# Patient Record
Sex: FEMALE | Race: WHITE | NOT HISPANIC OR LATINO | Employment: OTHER | URBAN - METROPOLITAN AREA
[De-identification: names, ages, dates, MRNs, and addresses within clinical notes are randomized per-mention and may not be internally consistent; named-entity substitution may affect disease eponyms.]

---

## 2017-01-06 ENCOUNTER — OFFICE VISIT (OUTPATIENT)
Dept: SURGERY | Facility: CLINIC | Age: 37
End: 2017-01-06
Payer: MEDICARE

## 2017-01-06 VITALS
HEIGHT: 66 IN | SYSTOLIC BLOOD PRESSURE: 118 MMHG | WEIGHT: 151.25 LBS | TEMPERATURE: 97 F | HEART RATE: 99 BPM | DIASTOLIC BLOOD PRESSURE: 75 MMHG | BODY MASS INDEX: 24.31 KG/M2

## 2017-01-06 DIAGNOSIS — K31.1 GASTRIC OUTFLOW OBSTRUCTION: Primary | ICD-10-CM

## 2017-01-06 PROCEDURE — 99213 OFFICE O/P EST LOW 20 MIN: CPT | Mod: PBBFAC,PO | Performed by: SURGERY

## 2017-01-06 PROCEDURE — 99024 POSTOP FOLLOW-UP VISIT: CPT | Mod: ,,, | Performed by: SURGERY

## 2017-01-06 PROCEDURE — 99999 PR PBB SHADOW E&M-EST. PATIENT-LVL III: CPT | Mod: PBBFAC,,, | Performed by: SURGERY

## 2017-01-06 NOTE — PROGRESS NOTES
Patient taken from OSH for an obstructing lap band  Was removed  Patient has since been to the ED for a thorough evaluation  No problems detected  She arrives today 45 minutes late  Well healed  No problems with nausea or vomiting  She complains of pain and asks for pain meds  I have corrected the problem that brought her to [facility] and will not see her in the future unless another acute issue develops  Pain meds NOT given

## 2017-06-01 ENCOUNTER — HOSPITAL ENCOUNTER (EMERGENCY)
Facility: OTHER | Age: 37
Discharge: HOME | End: 2017-06-01
Attending: EMERGENCY MEDICINE
Payer: MEDICARE

## 2017-06-01 VITALS
WEIGHT: 171.38 LBS | HEART RATE: 87 BPM | SYSTOLIC BLOOD PRESSURE: 138 MMHG | OXYGEN SATURATION: 100 % | TEMPERATURE: 99 F | RESPIRATION RATE: 16 BRPM | BODY MASS INDEX: 27.54 KG/M2 | HEIGHT: 66 IN | DIASTOLIC BLOOD PRESSURE: 76 MMHG

## 2017-06-01 DIAGNOSIS — S80.11XA CONTUSION, KNEE AND LOWER LEG, RIGHT, INITIAL ENCOUNTER: ICD-10-CM

## 2017-06-01 DIAGNOSIS — A46 ERYSIPELAS OF LOWER EXTREMITY: ICD-10-CM

## 2017-06-01 DIAGNOSIS — M79.604 RIGHT LEG PAIN: Primary | ICD-10-CM

## 2017-06-01 DIAGNOSIS — S80.01XA CONTUSION, KNEE AND LOWER LEG, RIGHT, INITIAL ENCOUNTER: ICD-10-CM

## 2017-06-01 LAB
B-HCG UR QL: NEGATIVE
CTP QC/QA: YES

## 2017-06-01 PROCEDURE — 99284 EMERGENCY DEPT VISIT MOD MDM: CPT | Mod: 25

## 2017-06-01 PROCEDURE — 63600175 PHARM REV CODE 636 W HCPCS: Performed by: EMERGENCY MEDICINE

## 2017-06-01 PROCEDURE — 96372 THER/PROPH/DIAG INJ SC/IM: CPT

## 2017-06-01 PROCEDURE — 81025 URINE PREGNANCY TEST: CPT | Performed by: EMERGENCY MEDICINE

## 2017-06-01 RX ORDER — MUPIROCIN 20 MG/G
OINTMENT TOPICAL 3 TIMES DAILY
Qty: 22 G | Refills: 0 | Status: SHIPPED | OUTPATIENT
Start: 2017-06-01 | End: 2017-06-11

## 2017-06-01 RX ORDER — KETOROLAC TROMETHAMINE 30 MG/ML
60 INJECTION, SOLUTION INTRAMUSCULAR; INTRAVENOUS
Status: COMPLETED | OUTPATIENT
Start: 2017-06-01 | End: 2017-06-01

## 2017-06-01 RX ORDER — DOXYCYCLINE 100 MG/1
100 CAPSULE ORAL 2 TIMES DAILY
Qty: 20 CAPSULE | Refills: 0 | Status: SHIPPED | OUTPATIENT
Start: 2017-06-01 | End: 2017-06-11

## 2017-06-01 RX ADMIN — KETOROLAC TROMETHAMINE 60 MG: 60 INJECTION, SOLUTION INTRAMUSCULAR at 08:06

## 2017-06-01 NOTE — ED TRIAGE NOTES
Pt states she has been camping, pt has insect bites on L/R lower and upper leg, swelling noted to right leg, pt c/o itching and pain

## 2017-06-01 NOTE — ED PROVIDER NOTES
Encounter Date: 6/1/2017       History     Chief Complaint   Patient presents with    Leg Pain     states she went camping and was bitten on her legs and now has swelling to the left leg, the left lower leg is swollen and warm to the touch     Review of patient's allergies indicates:   Allergen Reactions    Clindamycin Hives    Biaxin [clarithromycin] Hives and Itching    Cephalexin Hives    Sulfa (sulfonamide antibiotics) Hives and Itching       Leg Pain    Incident location: camping in the woods. The injury mechanism was a fall (mechanical fall onto wet deck). The incident occurred several days ago (1 week ago). The pain is present in the right knee, right leg and right thigh. The quality of the pain is described as aching. The pain has been constant since onset. Pertinent negatives include no numbness, no inability to bear weight, no loss of motion, no loss of sensation and no tingling. She reports no foreign bodies present. The symptoms are aggravated by activity, bearing weight and palpation. She has tried nothing for the symptoms.   last tetanus unknown.  Past Medical History:   Diagnosis Date    Asthma     Depression     GERD (gastroesophageal reflux disease)     Immune deficiency disorder     Migraine headache     Pseudotumor cerebri     Seizures     Thyroid disease      Past Surgical History:   Procedure Laterality Date    APPENDECTOMY      BRAIN SURGERY  2008    2x    FRACTURE SURGERY      LAPAROSCOPIC GASTRIC BANDING      SHUNT TAP       Family History   Problem Relation Age of Onset    Deep vein thrombosis Father     Deep vein thrombosis Brother      Social History   Substance Use Topics    Smoking status: Current Every Day Smoker    Smokeless tobacco: Not on file    Alcohol use Yes     Review of Systems   Constitutional: Negative for activity change and appetite change.   HENT: Negative.    Respiratory: Negative for shortness of breath.    Cardiovascular: Positive for leg swelling  (right leg). Negative for chest pain and palpitations.   Gastrointestinal: Negative for abdominal pain, diarrhea and nausea.   Genitourinary: Negative for decreased urine volume and difficulty urinating.   Musculoskeletal: Positive for arthralgias, joint swelling and myalgias. Negative for back pain and gait problem.   Skin: Positive for color change and wound.   Neurological: Negative for tingling and numbness.       Physical Exam     Initial Vitals [06/01/17 0647]   BP Pulse Resp Temp SpO2   (!) 143/77 91 16 98.7 °F (37.1 °C) 97 %     Physical Exam    Nursing note and vitals reviewed.  Constitutional: She appears well-developed and well-nourished. She is not diaphoretic. No distress.   HENT:   Head: Normocephalic and atraumatic.   Mouth/Throat: Oropharynx is clear and moist. No oropharyngeal exudate.   Eyes: EOM are normal. Pupils are equal, round, and reactive to light.   Neck: Normal range of motion. Neck supple.   Cardiovascular: Normal rate, regular rhythm and intact distal pulses.   No murmur heard.  Pulmonary/Chest: Breath sounds normal. No stridor. No respiratory distress.   Abdominal: Soft. Bowel sounds are normal. There is no tenderness.   Musculoskeletal: Normal range of motion. She exhibits no edema or tenderness.   Neurological: She is alert and oriented to person, place, and time. She has normal strength. No cranial nerve deficit.   Skin: Skin is warm and dry. Abrasion noted. There is erythema. No pallor.        Psychiatric: She has a normal mood and affect.         ED Course   Procedures  Labs Reviewed - No data to display                            ED Course     Labs Reviewed  Admission on 06/01/2017   Component Date Value Ref Range Status    POC Preg Test, Ur 06/01/2017 Negative  Negative Final     Acceptable 06/01/2017 Yes   Final        Imaging Reviewed    Imaging Results          US Lower Extremity Veins Right (Final result)  Result time 06/01/17 09:18:45    Final result by  Isaiah Greco MD (06/01/17 09:18:45)                 Narrative:       US LOWER EXTREMITY VEINS RIGHT  Clinical history: Right leg pain.     Comparison: None.     Findings:  Grayscale and color Doppler ultrasound was performed on the right lower extremity for the   purposes of evaluating the venous structures.  The right common femoral, greater   saphenous, greater saphenous junction, superficial femoral, popliteal, right peroneal,   right posterior tibial and anterior tibial veins are patent and compressible.  Color   Doppler demonstrates normal filling.  No echogenic debris is noted within the lumen.    There are normal waveforms with good augmentation.     Impression:  No sonographic evidence of deep vein thrombosis in the right lower extremity.     SL: 24     Signed by: Isaiah Greco M.D.  2017-06-01 09:18:44                              Medications given in ED    Medications   ketorolac injection 60 mg (60 mg Intramuscular Given 6/1/17 0826)       Discharge Medications     Medication List with Changes/Refills   New Medications    DOXYCYCLINE (VIBRAMYCIN) 100 MG CAP    Take 1 capsule (100 mg total) by mouth 2 (two) times daily.    MUPIROCIN (BACTROBAN) 2 % OINTMENT    Apply topically 3 (three) times daily.   Current Medications    ALPRAZOLAM (XANAX) 2 MG TAB    Take 2 mg by mouth.    DEXTROAMPHETAMINE-AMPHETAMINE (ADDERALL XR) 30 MG 24 HR CAPSULE    Take 30 mg by mouth every morning.    IBUPROFEN (ADVIL,MOTRIN) 200 MG TABLET    Take 200 mg by mouth every 6 (six) hours as needed for Pain.    LANSOPRAZOLE (PREVACID) 15 MG CAPSULE    Take by mouth.    LEVOTHYROXINE (SYNTHROID) 75 MCG TABLET    Take 75 mcg by mouth once daily.    OMEPRAZOLE (PRILOSEC) 20 MG CAPSULE    Take 20 mg by mouth once daily.    ONDANSETRON (ZOFRAN-ODT) 8 MG TBDL    Take 1 tablet (8 mg total) by mouth every 6 (six) hours as needed.    OXYCODONE-ACETAMINOPHEN (PERCOCET) 5-325 MG PER TABLET    Take 1-2 tablets by mouth every 4 (four)  hours as needed for Pain.    RANITIDINE (ZANTAC) 300 MG TABLET    Take 300 mg by mouth.    TOPIRAMATE (TOPAMAX) 100 MG TABLET    Take by mouth.             Patient discharged to home in stable condition with instructions to:   1. Please take all meds as prescribed.  2. Follow-up with your primary care doctor   3. Return precautions discussed and patient and/or family/caretaker understands to return to the emergency room for any concerns including worsening of your current symptoms, fever, chills, night sweats, worsening pain, chest pain, shortness of breath, nausea, vomiting, diarrhea, bleeding, headache, difficulty talking, visual disturbances, weakness, numbness or any other acute concerns    Clinical Impression:   The encounter diagnosis was Right leg pain.          Vito Worthy MD  06/07/17 0113

## 2017-07-20 DIAGNOSIS — M25.561 RIGHT KNEE PAIN: Primary | ICD-10-CM

## 2017-07-21 DIAGNOSIS — N63.0 LUMP, BREAST: Primary | ICD-10-CM

## 2017-10-11 ENCOUNTER — HOSPITAL ENCOUNTER (EMERGENCY)
Facility: OTHER | Age: 37
Discharge: HOME | End: 2017-10-12
Attending: EMERGENCY MEDICINE
Payer: MEDICARE
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
## 2017-10-11 DIAGNOSIS — S80.01XA CONTUSION OF RIGHT KNEE, INITIAL ENCOUNTER: ICD-10-CM

## 2017-10-11 DIAGNOSIS — F07.81 CONCUSSION SYNDROME: ICD-10-CM

## 2017-10-11 DIAGNOSIS — M25.569 KNEE PAIN: ICD-10-CM

## 2017-10-11 DIAGNOSIS — R51.9 NONINTRACTABLE HEADACHE, UNSPECIFIED CHRONICITY PATTERN, UNSPECIFIED HEADACHE TYPE: ICD-10-CM

## 2017-10-11 LAB
B-HCG UR QL: NEGATIVE
CTP QC/QA: YES

## 2017-10-11 PROCEDURE — 25000003 PHARM REV CODE 250: Performed by: PHYSICIAN ASSISTANT

## 2017-10-11 PROCEDURE — 99284 EMERGENCY DEPT VISIT MOD MDM: CPT | Mod: 25

## 2017-10-11 PROCEDURE — 81025 URINE PREGNANCY TEST: CPT | Performed by: EMERGENCY MEDICINE

## 2017-10-11 RX ORDER — KETOROLAC TROMETHAMINE 30 MG/ML
30 INJECTION, SOLUTION INTRAMUSCULAR; INTRAVENOUS
Status: COMPLETED | OUTPATIENT
Start: 2017-10-12 | End: 2017-10-12

## 2017-10-11 RX ORDER — DIPHENHYDRAMINE HCL 25 MG
25 CAPSULE ORAL
Status: COMPLETED | OUTPATIENT
Start: 2017-10-11 | End: 2017-10-11

## 2017-10-11 RX ORDER — METOCLOPRAMIDE 10 MG/1
10 TABLET ORAL
Status: COMPLETED | OUTPATIENT
Start: 2017-10-11 | End: 2017-10-11

## 2017-10-11 RX ORDER — TIZANIDINE HYDROCHLORIDE 4 MG/1
CAPSULE, GELATIN COATED ORAL
Status: ON HOLD | COMMUNITY
End: 2018-03-28 | Stop reason: HOSPADM

## 2017-10-11 RX ORDER — DIPHENHYDRAMINE HYDROCHLORIDE 50 MG/ML
12.5 INJECTION INTRAMUSCULAR; INTRAVENOUS
Status: DISCONTINUED | OUTPATIENT
Start: 2017-10-11 | End: 2017-10-11

## 2017-10-11 RX ORDER — BUTALBITAL, ACETAMINOPHEN AND CAFFEINE 50; 325; 40 MG/1; MG/1; MG/1
1 TABLET ORAL
Status: COMPLETED | OUTPATIENT
Start: 2017-10-11 | End: 2017-10-11

## 2017-10-11 RX ORDER — GABAPENTIN 100 MG/1
200 CAPSULE ORAL 2 TIMES DAILY
COMMUNITY

## 2017-10-11 RX ORDER — METOCLOPRAMIDE HYDROCHLORIDE 5 MG/ML
10 INJECTION INTRAMUSCULAR; INTRAVENOUS
Status: DISCONTINUED | OUTPATIENT
Start: 2017-10-11 | End: 2017-10-11

## 2017-10-11 RX ADMIN — METOCLOPRAMIDE 10 MG: 10 TABLET ORAL at 11:10

## 2017-10-11 RX ADMIN — DIPHENHYDRAMINE HYDROCHLORIDE 25 MG: 25 CAPSULE ORAL at 11:10

## 2017-10-11 RX ADMIN — BUTALBITAL, ACETAMINOPHEN AND CAFFEINE 1 TABLET: 50; 325; 40 TABLET ORAL at 11:10

## 2017-10-12 VITALS
BODY MASS INDEX: 26.2 KG/M2 | TEMPERATURE: 98 F | HEIGHT: 66 IN | RESPIRATION RATE: 16 BRPM | WEIGHT: 163 LBS | SYSTOLIC BLOOD PRESSURE: 135 MMHG | DIASTOLIC BLOOD PRESSURE: 90 MMHG | OXYGEN SATURATION: 99 % | HEART RATE: 84 BPM

## 2017-10-12 PROCEDURE — 96372 THER/PROPH/DIAG INJ SC/IM: CPT

## 2017-10-12 PROCEDURE — 63600175 PHARM REV CODE 636 W HCPCS: Performed by: PHYSICIAN ASSISTANT

## 2017-10-12 RX ADMIN — KETOROLAC TROMETHAMINE 30 MG: 30 INJECTION, SOLUTION INTRAMUSCULAR at 12:10

## 2017-10-12 NOTE — ED PROVIDER NOTES
"Encounter Date: 10/11/2017       History     Chief Complaint   Patient presents with    Head Injury     Pt states she tripped and fell, hit head on amp and has headache with n/v, blurry vision. Pt states she saw primary md who referred her to ed for head CT. Neuro intact.      Patient is 37-year-old female history of pseudotumor cerebri with  shunt in place, seizures, thyroid disease, depression, migraine headaches who presents with complaints of headache, blurred vision after mechanical slip and fall 2 days prior to arrival.  Patient reports she was at home in her usual state of health when she slipped and fell striking the right side of her head on and exam.  She reports no loss of consciousness but does admit that upon returning to standing position after this incident she had vomiting.  She reports all day yesterday she had occasional episodes of vomiting and headache.  She reports no vomiting today. She reports blurred vision and a "swimming "feeling in her head today.  She presented to her primary care provider today for evaluation and was referred to the emergency department for CT scan of the brain.  Patient admits that she had car trouble and was unable to get to the ER until later today which is why she presents at 10:00pm at night.  She is currently unaccompanied in the ER.          Review of patient's allergies indicates:   Allergen Reactions    Clindamycin Hives    Biaxin [clarithromycin] Hives and Itching    Cephalexin Hives    Sulfa (sulfonamide antibiotics) Hives and Itching     Past Medical History:   Diagnosis Date    Asthma     Depression     GERD (gastroesophageal reflux disease)     Immune deficiency disorder     Migraine headache     Pseudotumor cerebri     Seizures     Thyroid disease      Past Surgical History:   Procedure Laterality Date    APPENDECTOMY      BRAIN SURGERY  2008    2x    FRACTURE SURGERY      LAPAROSCOPIC GASTRIC BANDING      SHUNT TAP       Family History "   Problem Relation Age of Onset    Deep vein thrombosis Father     Deep vein thrombosis Brother      Social History   Substance Use Topics    Smoking status: Current Every Day Smoker    Smokeless tobacco: Not on file    Alcohol use Yes      Comment: social     Review of Systems   Constitutional: Negative for fever.   HENT: Negative for sore throat.    Respiratory: Negative for shortness of breath.    Cardiovascular: Negative for chest pain.   Gastrointestinal: Positive for vomiting. Negative for nausea.   Genitourinary: Negative for dysuria.   Musculoskeletal: Negative for back pain.   Skin: Negative for rash.   Neurological: Positive for headaches. Negative for weakness.   Hematological: Does not bruise/bleed easily.       Physical Exam     Initial Vitals [10/11/17 2201]   BP Pulse Resp Temp SpO2   138/85 94 18 98.1 °F (36.7 °C) 100 %      MAP       102.67         Physical Exam    Nursing note and vitals reviewed.  Constitutional: She appears well-developed and well-nourished. She is not diaphoretic. No distress.   Healthy appearing female in no acute distress but does appear to be feeling uncomfortable during interview and exam.  She makes good eye contact, speaks in clear full sentences and ambulates with ease.  No vomiting during my exam.   HENT:   Head: Normocephalic and atraumatic.   Right Ear: External ear normal.   Left Ear: External ear normal.   Nose: Nose normal.   Mouth/Throat: Oropharynx is clear and moist. No oropharyngeal exudate.   No hemotympanum  No arita sign  No raccoon eyes   Eyes: Conjunctivae and EOM are normal. Pupils are equal, round, and reactive to light. Right eye exhibits no discharge. Left eye exhibits no discharge. No scleral icterus.   She has normal extraocular muscle movement with assessing by movement she admits that she develops blurred vision and states that her vision looks like water colors.   Neck: Normal range of motion.   Cardiovascular: Normal rate, regular rhythm,  normal heart sounds and intact distal pulses. Exam reveals no gallop and no friction rub.    No murmur heard.  Pulmonary/Chest: Breath sounds normal. She has no wheezes. She has no rhonchi. She has no rales.   Abdominal: Soft. Bowel sounds are normal. There is no tenderness. There is no rebound and no guarding.   Musculoskeletal: Normal range of motion. She exhibits no edema or tenderness.   Lymphadenopathy:     She has no cervical adenopathy.   Neurological: She is alert and oriented to person, place, and time. She has normal strength. No cranial nerve deficit or sensory deficit.   Skin: Skin is warm. Capillary refill takes less than 2 seconds. No rash and no abscess noted. No erythema.   Psychiatric: She has a normal mood and affect. Her behavior is normal. Thought content normal.         ED Course   Procedures  Labs Reviewed   POCT URINE PREGNANCY         Imaging Results          X-Ray Knee 3 View Right (Final result)  Result time 10/11/17 23:11:54    Final result by Chanelle Bowers MD (10/11/17 23:11:54)                 Impression:      No acute osseous abnormality identified.      Electronically signed by: CHANELLE BOWERS MD  Date:     10/11/17  Time:    23:11              Narrative:    Right knee 3 views.  Comparison: None.    No evidence of acute fracture, dislocation, or osseous destructive process.  There is small triangular ossification which appears to arise from the anterior aspect of the patella on sunrise view.  This appears corticated and does not have the appearance of a fracture and may represent possible enthesophyte, although difficult to appreciate on other projections.  There is mild medial compartment DJD with mild joint space narrowing with minimal marginal osteophytosis.  No evidence of large suprapatellar effusion.                             CT Head Without Contrast (Final result)  Result time 10/11/17 23:01:26    Final result by Chanelle Bowers MD (10/11/17 23:01:26)                  Impression:       No acute intracranial abnormalities identified.    Right frontal coursing  shunt catheter in similar position with slitlike appearance of the ventricular system.  This could reflect potential over shunting, however appears unchanged from prior CT 9/2016.      Electronically signed by: CHANELLE BOWERS MD  Date:     10/11/17  Time:    23:01              Narrative:    Exam: CT of the head without contrast -- 37-year-old female status post fall.    Comparison: CT head 9/2016.    Technique: 5 mm noncontrast axial images through the brain were obtained.    Findings: There is a right-sided  shunt catheter visualized entering via a right frontal approach.  Its enters the right lateral ventricle and terminates at midline in the region of the third ventricle.  This is similar in location compared to prior CT.  The ventricular system has a slitlike appearance.  No hydrocephalus.  No evidence of acute/recent major vascular distribution cerebral infarction, intraparenchymal hemorrhage, or intra-axial space occupying lesion. The ventricular system is normal in appearance for age. No hydrocephalus. No effacement of the skull-base cisterns. No abnormal extra-axial fluid collections or blood products. The paranasal sinuses and mastoid air cells are unremarkable. The calvarium shows no significant abnormality.                                   Medical Decision Making:   ED Management:  Urgent evaluation of 37-year-old female who presents with complaints of head injury with concern for concussion syndrome.  She is afebrile, nontoxic appearing, hemodynamically stable.  Physical exam outlined above and reveals no cranial nerve deficits or gait abnormalities.  She has normal cardiopulmonary auscultation.  CT imaging pending.  Reglan and Benadryl given for headache pain.  We'll continue to monitor.  UPDATE: patient refuses IV.  She is given oral medications with minimal improvement in symptoms.  She requests IM Toradol  which will be given.  No acute findings on CT brain or knee x-ray.  Suspect symptoms could be related to concussion syndrome.  No further intervention felt warranted at this time.  We will discharge with contact information for neurology and instruction to follow-up with her primary care provider in 1-2 days for symptom recheck.  She is educated him return precautions and verbalizes understanding.  Case discussed with attending who agrees with plan.  Other:   I have discussed this case with another health care provider.       <> Summary of the Discussion: [place]                   ED Course      Clinical Impression:   The primary encounter diagnosis was Fall on same level from slipping, tripping or stumbling, initial encounter. Diagnoses of Knee pain, Nonintractable headache, unspecified chronicity pattern, unspecified headache type, Concussion syndrome, and Contusion of right knee, initial encounter were also pertinent to this visit.                           Jing Valenzuela PA-C  10/12/17 0114

## 2017-10-12 NOTE — ED NOTES
Hit rt temple on an amp after feet slipped out from under her night before last. Unsure of LOC . Pain also to rt side. Vomited immediately after fall and has been nauseated since with dizziness since and + vision change.

## 2017-12-21 ENCOUNTER — TELEPHONE (OUTPATIENT)
Dept: NEUROSURGERY | Facility: CLINIC | Age: 37
End: 2017-12-21

## 2017-12-27 ENCOUNTER — TELEPHONE (OUTPATIENT)
Dept: NEUROSURGERY | Facility: CLINIC | Age: 37
End: 2017-12-27

## 2017-12-27 NOTE — TELEPHONE ENCOUNTER
Advised patient that appt was scheduled in error. Offered appt 1/10/17 at 0930, patient agreed to appt date, time and location.

## 2018-01-10 ENCOUNTER — TELEPHONE (OUTPATIENT)
Dept: NEUROSURGERY | Facility: CLINIC | Age: 38
End: 2018-01-10

## 2018-01-10 NOTE — TELEPHONE ENCOUNTER
L/M on VM asking patient about multiple cancellations and if there was something we could do to help her. Asked that she contact me.

## 2018-01-26 ENCOUNTER — TELEPHONE (OUTPATIENT)
Dept: NEUROSURGERY | Facility: CLINIC | Age: 38
End: 2018-01-26

## 2018-01-26 NOTE — TELEPHONE ENCOUNTER
----- Message from Darion Osorio sent at 1/26/2018  1:44 PM CST -----  Contact: Self @ [phone]  Pt is calling to reschedule missed appt on 01/24. Appt notes state to contact you about scheduling. Pls call.

## 2018-02-21 ENCOUNTER — TELEPHONE (OUTPATIENT)
Dept: NEUROSURGERY | Facility: CLINIC | Age: 38
End: 2018-02-21

## 2018-02-22 ENCOUNTER — HOSPITAL ENCOUNTER (EMERGENCY)
Facility: OTHER | Age: 38
Discharge: HOME | End: 2018-02-22
Attending: EMERGENCY MEDICINE
Payer: MEDICAID

## 2018-02-22 VITALS
HEIGHT: 66 IN | WEIGHT: 175 LBS | TEMPERATURE: 99 F | OXYGEN SATURATION: 100 % | SYSTOLIC BLOOD PRESSURE: 139 MMHG | BODY MASS INDEX: 28.12 KG/M2 | DIASTOLIC BLOOD PRESSURE: 91 MMHG | RESPIRATION RATE: 18 BRPM | HEART RATE: 74 BPM

## 2018-02-22 DIAGNOSIS — N12 PYELONEPHRITIS: Primary | ICD-10-CM

## 2018-02-22 LAB
ALBUMIN SERPL BCP-MCNC: 4.2 G/DL
ALP SERPL-CCNC: 85 U/L
ALT SERPL W/O P-5'-P-CCNC: 16 U/L
ANION GAP SERPL CALC-SCNC: 10 MMOL/L
ANISOCYTOSIS BLD QL SMEAR: SLIGHT
AST SERPL-CCNC: 18 U/L
B-HCG UR QL: NEGATIVE
BACTERIA #/AREA URNS HPF: ABNORMAL /HPF
BASOPHILS # BLD AUTO: ABNORMAL K/UL
BASOPHILS NFR BLD: 0 %
BILIRUB SERPL-MCNC: 0.8 MG/DL
BILIRUB UR QL STRIP: ABNORMAL
BUN SERPL-MCNC: 12 MG/DL
CALCIUM SERPL-MCNC: 9.4 MG/DL
CHLORIDE SERPL-SCNC: 111 MMOL/L
CLARITY UR: ABNORMAL
CO2 SERPL-SCNC: 22 MMOL/L
COLOR UR: ABNORMAL
CREAT SERPL-MCNC: 1 MG/DL
CTP QC/QA: YES
DIFFERENTIAL METHOD: ABNORMAL
EOSINOPHIL # BLD AUTO: ABNORMAL K/UL
EOSINOPHIL NFR BLD: 0 %
ERYTHROCYTE [DISTWIDTH] IN BLOOD BY AUTOMATED COUNT: 15.1 %
EST. GFR  (AFRICAN AMERICAN): >60 ML/MIN/1.73 M^2
EST. GFR  (NON AFRICAN AMERICAN): >60 ML/MIN/1.73 M^2
GLUCOSE SERPL-MCNC: 88 MG/DL
GLUCOSE UR QL STRIP: NEGATIVE
HCT VFR BLD AUTO: 42.3 %
HGB BLD-MCNC: 14 G/DL
HGB UR QL STRIP: ABNORMAL
HYALINE CASTS #/AREA URNS LPF: 0 /LPF
KETONES UR QL STRIP: ABNORMAL
LEUKOCYTE ESTERASE UR QL STRIP: ABNORMAL
LYMPHOCYTES # BLD AUTO: ABNORMAL K/UL
LYMPHOCYTES NFR BLD: 9 %
MCH RBC QN AUTO: 30.6 PG
MCHC RBC AUTO-ENTMCNC: 33.1 G/DL
MCV RBC AUTO: 93 FL
MICROSCOPIC COMMENT: ABNORMAL
MONOCYTES # BLD AUTO: ABNORMAL K/UL
MONOCYTES NFR BLD: 11 %
NEUTROPHILS NFR BLD: 80 %
NITRITE UR QL STRIP: POSITIVE
PH UR STRIP: 6 [PH] (ref 5–8)
PLATELET # BLD AUTO: 255 K/UL
PLATELET BLD QL SMEAR: ABNORMAL
PMV BLD AUTO: 9.8 FL
POTASSIUM SERPL-SCNC: 3.8 MMOL/L
PROT SERPL-MCNC: 7.2 G/DL
PROT UR QL STRIP: ABNORMAL
RBC # BLD AUTO: 4.57 M/UL
RBC #/AREA URNS HPF: ABNORMAL /HPF (ref 0–4)
SODIUM SERPL-SCNC: 143 MMOL/L
SP GR UR STRIP: >=1.03 (ref 1–1.03)
SQUAMOUS #/AREA URNS HPF: 3 /HPF
URN SPEC COLLECT METH UR: ABNORMAL
UROBILINOGEN UR STRIP-ACNC: 1 EU/DL
WBC # BLD AUTO: 18.5 K/UL
WBC #/AREA URNS HPF: >100 /HPF (ref 0–5)

## 2018-02-22 PROCEDURE — 96376 TX/PRO/DX INJ SAME DRUG ADON: CPT

## 2018-02-22 PROCEDURE — 25000003 PHARM REV CODE 250: Performed by: EMERGENCY MEDICINE

## 2018-02-22 PROCEDURE — 99284 EMERGENCY DEPT VISIT MOD MDM: CPT | Mod: 25

## 2018-02-22 PROCEDURE — 81025 URINE PREGNANCY TEST: CPT | Performed by: EMERGENCY MEDICINE

## 2018-02-22 PROCEDURE — 81000 URINALYSIS NONAUTO W/SCOPE: CPT

## 2018-02-22 PROCEDURE — 96375 TX/PRO/DX INJ NEW DRUG ADDON: CPT

## 2018-02-22 PROCEDURE — 96365 THER/PROPH/DIAG IV INF INIT: CPT

## 2018-02-22 PROCEDURE — 85025 COMPLETE CBC W/AUTO DIFF WBC: CPT

## 2018-02-22 PROCEDURE — 63600175 PHARM REV CODE 636 W HCPCS: Performed by: EMERGENCY MEDICINE

## 2018-02-22 PROCEDURE — 96361 HYDRATE IV INFUSION ADD-ON: CPT

## 2018-02-22 PROCEDURE — 80053 COMPREHEN METABOLIC PANEL: CPT

## 2018-02-22 RX ORDER — LEVOFLOXACIN 750 MG/1
750 TABLET ORAL DAILY
Qty: 5 TABLET | Refills: 0 | Status: ON HOLD | OUTPATIENT
Start: 2018-02-22 | End: 2018-03-28 | Stop reason: HOSPADM

## 2018-02-22 RX ORDER — MORPHINE SULFATE 4 MG/ML
4 INJECTION, SOLUTION INTRAMUSCULAR; INTRAVENOUS
Status: COMPLETED | OUTPATIENT
Start: 2018-02-22 | End: 2018-02-22

## 2018-02-22 RX ORDER — ONDANSETRON 2 MG/ML
4 INJECTION INTRAMUSCULAR; INTRAVENOUS
Status: COMPLETED | OUTPATIENT
Start: 2018-02-22 | End: 2018-02-22

## 2018-02-22 RX ORDER — KETOROLAC TROMETHAMINE 30 MG/ML
30 INJECTION, SOLUTION INTRAMUSCULAR; INTRAVENOUS
Status: COMPLETED | OUTPATIENT
Start: 2018-02-22 | End: 2018-02-22

## 2018-02-22 RX ORDER — CIPROFLOXACIN 2 MG/ML
400 INJECTION, SOLUTION INTRAVENOUS
Status: COMPLETED | OUTPATIENT
Start: 2018-02-22 | End: 2018-02-22

## 2018-02-22 RX ADMIN — MORPHINE SULFATE 4 MG: 4 INJECTION, SOLUTION INTRAMUSCULAR; INTRAVENOUS at 05:02

## 2018-02-22 RX ADMIN — ONDANSETRON 4 MG: 2 INJECTION INTRAMUSCULAR; INTRAVENOUS at 04:02

## 2018-02-22 RX ADMIN — KETOROLAC TROMETHAMINE 30 MG: 30 INJECTION, SOLUTION INTRAMUSCULAR at 04:02

## 2018-02-22 RX ADMIN — CIPROFLOXACIN 400 MG: 2 INJECTION, SOLUTION INTRAVENOUS at 05:02

## 2018-02-22 RX ADMIN — SODIUM CHLORIDE 1000 ML: 0.9 INJECTION, SOLUTION INTRAVENOUS at 04:02

## 2018-02-22 RX ADMIN — ONDANSETRON 4 MG: 2 INJECTION INTRAMUSCULAR; INTRAVENOUS at 05:02

## 2018-02-22 NOTE — ED PROVIDER NOTES
Encounter Date: 2/22/2018       History     Chief Complaint   Patient presents with    Abdominal Pain     Pt c/o LLQ pain, nausea, denies diarrhea. She states she is having bleeding but unsure if it is vaginal or urinary     Seen by provider at 4:07PM:      Pt is as 37 y/o F who presents to ED with LLQ pain, radiating to the back, acutely started 12 hours ago.  Pt denies having pain like this in the past. She also notes hematuria and dysuria. Denies f/c.  +nausea.  Denies vomiting/diarrhea.  Denies any vaginal bleeding.   Denies hx of kidney stones.            Review of patient's allergies indicates:   Allergen Reactions    Clindamycin Hives    Biaxin [clarithromycin] Hives and Itching    Cephalexin Hives    Sulfa (sulfonamide antibiotics) Hives and Itching     Past Medical History:   Diagnosis Date    Asthma     Depression     GERD (gastroesophageal reflux disease)     Immune deficiency disorder     Migraine headache     Pseudotumor cerebri     Seizures     Thyroid disease      Past Surgical History:   Procedure Laterality Date    APPENDECTOMY      BRAIN SURGERY  2008    2x    FRACTURE SURGERY      LAPAROSCOPIC GASTRIC BANDING      SHUNT TAP       Family History   Problem Relation Age of Onset    Deep vein thrombosis Father     Deep vein thrombosis Brother      Social History   Substance Use Topics    Smoking status: Current Every Day Smoker     Types: Cigarettes    Smokeless tobacco: Never Used    Alcohol use Yes      Comment: social     Review of Systems   Constitutional: Negative for chills and fever.   HENT: Negative for congestion and rhinorrhea.    Respiratory: Negative for chest tightness and stridor.    Gastrointestinal: Positive for abdominal pain and nausea. Negative for vomiting.   Genitourinary: Positive for dysuria and hematuria.   Musculoskeletal: Negative for back pain and neck pain.   Skin: Negative for color change and rash.   Neurological: Negative for dizziness and  headaches.       Physical Exam     Initial Vitals [02/22/18 1500]   BP Pulse Resp Temp SpO2   (!) 133/100 93 18 98.8 °F (37.1 °C) 99 %      MAP       111         Physical Exam    Nursing note and vitals reviewed.  Constitutional: She appears well-developed and well-nourished.   Appears uncomfortable.     HENT:   Head: Normocephalic and atraumatic.   Mouth/Throat: Oropharynx is clear and moist.   Eyes: Conjunctivae and EOM are normal.   Neck: Normal range of motion. Neck supple.   Cardiovascular: Normal rate, regular rhythm and normal heart sounds.   Pulmonary/Chest: Breath sounds normal. No respiratory distress. She has no wheezes. She has no rhonchi. She has no rales.   Abdominal: Soft. Bowel sounds are normal. She exhibits no distension. There is tenderness.   LLQ tenderness   Musculoskeletal: Normal range of motion. She exhibits no edema or tenderness.   L CVA tenderness    Neurological: She is alert and oriented to person, place, and time. She has normal strength.   Ambulatory with steady gait.     Skin: Skin is warm and dry. Capillary refill takes less than 2 seconds.         ED Course   Procedures  Labs Reviewed   URINALYSIS - Abnormal; Notable for the following:        Result Value    Color, UA Brown (*)     Appearance, UA Cloudy (*)     Specific Gravity, UA >=1.030 (*)     Protein, UA 3+ (*)     Ketones, UA 1+ (*)     Bilirubin (UA) 1+ (*)     Occult Blood UA 3+ (*)     Nitrite, UA Positive (*)     Leukocytes, UA 2+ (*)     All other components within normal limits   URINALYSIS MICROSCOPIC - Abnormal; Notable for the following:     WBC, UA >100 (*)     Bacteria, UA Many (*)     All other components within normal limits   COMPREHENSIVE METABOLIC PANEL - Abnormal; Notable for the following:     Chloride 111 (*)     CO2 22 (*)     All other components within normal limits   CBC W/ AUTO DIFFERENTIAL - Abnormal; Notable for the following:     WBC 18.50 (*)     RDW 15.1 (*)     Gran% 80.0 (*)     Lymph% 9.0 (*)      All other components within normal limits   POCT URINE PREGNANCY             Medical Decision Making:   History:   Old Medical Records: I decided to obtain old medical records.  Old Records Summarized: other records and records from previous admission(s).  Initial Assessment:   4:07PM:  Pt is a 39 y/o F who presents to ED with LLQ abdominal pain and urinary symptoms. Pt appears well, nontoxic, though appears uncomfortable. Will plan for labs, imaging, will continue to follow and reassess.   Clinical Tests:   Lab Tests: Ordered and Reviewed  Radiological Study: Ordered and Reviewed    6:14 PM: Pt doing well, feeling much better.  Her CT is negative for any evidence of stone but it does appear to have pyelo based on labs and imaging. Will plan for IV abx.  I updated pt regarding results and plan.  All questions answered.      6:41 PM:  Pt doing well.  Remains stable. Will plan to provide a prescription for abx to take.  I counseled pt regarding supportive care measures and I counseled pt regarding supportive care measures.   I have discussed with the pt ED return warnings and need for close PCP f/u.  Pt agreeable to plan and all questions answered.  I feel that pt is stable for discharge and management as an outpatient and no further intervention is needed at this time.  Pt is comfortable returning to the ED if needed.  Will DC home in stable condition.                          Clinical Impression:     1. Pyelonephritis                                 Linda Amezquita MD  02/22/18 1956

## 2018-02-22 NOTE — ED NOTES
Pt updated on plan of care and estimated time of stay based on testing. She is AAOx4, respirations even and unlabored with NAD noted at this time. Pt denies any needs at this time. Restroom and comfort needs addressed at this time. Safety precautions maintained. Recliner wheels locked and call light within reach. Friend present at bedside. Will continue to monitor for any changes.

## 2018-02-22 NOTE — ED NOTES
"Pt presenting to Ed with c/o left LLQ + left groin, left flank "stabbing" pain, nausea, dry heaving, and blood in her urine since this Am around appox 530. Pt reporting she is having normal BM's. Pt AAOx4 and appropriate at this time. Respirations even and unlabored. No acute distress noted.  Pt appears mildly anxious.   "

## 2018-02-22 NOTE — ED NOTES
Pt sitting up in recliner AAOx4 with VSS. Respirations even and unlabored. NAD noted. IV site maintained and patent with fluids infusing at this time. Pt c/o abdominal pain and rating pain 7/10. Pt informed she will receive pain and nausea medication. Warm blanket applied for comfort at this time. Pt denies any other needs. Will continue to monitor for any changes.

## 2018-02-23 NOTE — DISCHARGE INSTRUCTIONS
We have prescribed you antibiotics. Please fill and take as directed. It is important that you completely finish the antibiotics.      Please return to the ER if you have chest pain, difficulty breathing, fevers, altered mental status, dizziness, weakness, or any other concerns.      Follow up with your primary care physician.

## 2018-03-27 ENCOUNTER — HOSPITAL ENCOUNTER (OUTPATIENT)
Facility: HOSPITAL | Age: 38
Discharge: HOME | End: 2018-03-28
Attending: EMERGENCY MEDICINE | Admitting: HOSPITALIST
Payer: MEDICAID

## 2018-03-27 DIAGNOSIS — G93.2 PSEUDOTUMOR CEREBRI: Chronic | ICD-10-CM

## 2018-03-27 DIAGNOSIS — R55 SYNCOPE AND COLLAPSE: Primary | ICD-10-CM

## 2018-03-27 DIAGNOSIS — R55 SYNCOPE: ICD-10-CM

## 2018-03-27 DIAGNOSIS — G43.009 MIGRAINE WITHOUT AURA AND WITHOUT STATUS MIGRAINOSUS, NOT INTRACTABLE: Chronic | ICD-10-CM

## 2018-03-27 PROBLEM — F32.A DEPRESSION: Chronic | Status: ACTIVE | Noted: 2018-03-27

## 2018-03-27 PROBLEM — G43.909 MIGRAINE HEADACHE: Chronic | Status: ACTIVE | Noted: 2018-03-27

## 2018-03-27 PROBLEM — E07.9 THYROID DISEASE: Chronic | Status: ACTIVE | Noted: 2018-03-27

## 2018-03-27 LAB
ALBUMIN SERPL BCP-MCNC: 3.6 G/DL
ALP SERPL-CCNC: 59 U/L
ALT SERPL W/O P-5'-P-CCNC: 12 U/L
AMPHET+METHAMPHET UR QL: NEGATIVE
ANION GAP SERPL CALC-SCNC: 5 MMOL/L
APAP SERPL-MCNC: <3 UG/ML
AST SERPL-CCNC: 16 U/L
B-HCG UR QL: NEGATIVE
BARBITURATES UR QL SCN>200 NG/ML: NEGATIVE
BASOPHILS # BLD AUTO: 0.11 K/UL
BASOPHILS NFR BLD: 1.5 %
BENZODIAZ UR QL SCN>200 NG/ML: NORMAL
BILIRUB SERPL-MCNC: 0.2 MG/DL
BUN SERPL-MCNC: 18 MG/DL
BZE UR QL SCN: NEGATIVE
CALCIUM SERPL-MCNC: 8.9 MG/DL
CANNABINOIDS UR QL SCN: NORMAL
CHLORIDE SERPL-SCNC: 111 MMOL/L
CO2 SERPL-SCNC: 25 MMOL/L
CREAT SERPL-MCNC: 1 MG/DL
CREAT UR-MCNC: 58.4 MG/DL
CTP QC/QA: YES
D DIMER PPP IA.FEU-MCNC: 0.72 MG/L FEU
DIFFERENTIAL METHOD: ABNORMAL
EOSINOPHIL # BLD AUTO: 0.3 K/UL
EOSINOPHIL NFR BLD: 4 %
ERYTHROCYTE [DISTWIDTH] IN BLOOD BY AUTOMATED COUNT: 15 %
EST. GFR  (AFRICAN AMERICAN): >60 ML/MIN/1.73 M^2
EST. GFR  (NON AFRICAN AMERICAN): >60 ML/MIN/1.73 M^2
ETHANOL SERPL-MCNC: <10 MG/DL
GLUCOSE SERPL-MCNC: 81 MG/DL
HCT VFR BLD AUTO: 38.1 %
HGB BLD-MCNC: 12.6 G/DL
LYMPHOCYTES # BLD AUTO: 1.9 K/UL
LYMPHOCYTES NFR BLD: 26.5 %
MCH RBC QN AUTO: 31 PG
MCHC RBC AUTO-ENTMCNC: 33.1 G/DL
MCV RBC AUTO: 94 FL
METHADONE UR QL SCN>300 NG/ML: NEGATIVE
MONOCYTES # BLD AUTO: 0.9 K/UL
MONOCYTES NFR BLD: 11.9 %
NEUTROPHILS # BLD AUTO: 4 K/UL
NEUTROPHILS NFR BLD: 55.8 %
OPIATES UR QL SCN: NEGATIVE
PCP UR QL SCN>25 NG/ML: NEGATIVE
PLATELET # BLD AUTO: 253 K/UL
PMV BLD AUTO: 10.1 FL
POCT GLUCOSE: 65 MG/DL (ref 70–110)
POTASSIUM SERPL-SCNC: 4.4 MMOL/L
PROT SERPL-MCNC: 6.1 G/DL
RBC # BLD AUTO: 4.07 M/UL
SALICYLATES SERPL-MCNC: <5 MG/DL
SODIUM SERPL-SCNC: 141 MMOL/L
TOXICOLOGY INFORMATION: NORMAL
WBC # BLD AUTO: 7.22 K/UL

## 2018-03-27 PROCEDURE — 96374 THER/PROPH/DIAG INJ IV PUSH: CPT

## 2018-03-27 PROCEDURE — 80320 DRUG SCREEN QUANTALCOHOLS: CPT

## 2018-03-27 PROCEDURE — 85379 FIBRIN DEGRADATION QUANT: CPT

## 2018-03-27 PROCEDURE — G0378 HOSPITAL OBSERVATION PER HR: HCPCS

## 2018-03-27 PROCEDURE — 93005 ELECTROCARDIOGRAM TRACING: CPT

## 2018-03-27 PROCEDURE — 80307 DRUG TEST PRSMV CHEM ANLYZR: CPT

## 2018-03-27 PROCEDURE — 99285 EMERGENCY DEPT VISIT HI MDM: CPT | Mod: 25

## 2018-03-27 PROCEDURE — 80053 COMPREHEN METABOLIC PANEL: CPT

## 2018-03-27 PROCEDURE — 85025 COMPLETE CBC W/AUTO DIFF WBC: CPT

## 2018-03-27 PROCEDURE — 63600175 PHARM REV CODE 636 W HCPCS: Performed by: EMERGENCY MEDICINE

## 2018-03-27 PROCEDURE — 81025 URINE PREGNANCY TEST: CPT | Performed by: EMERGENCY MEDICINE

## 2018-03-27 PROCEDURE — 82962 GLUCOSE BLOOD TEST: CPT

## 2018-03-27 PROCEDURE — 93010 ELECTROCARDIOGRAM REPORT: CPT | Mod: ,,, | Performed by: INTERNAL MEDICINE

## 2018-03-27 PROCEDURE — 96372 THER/PROPH/DIAG INJ SC/IM: CPT | Mod: 59

## 2018-03-27 PROCEDURE — 25000003 PHARM REV CODE 250: Performed by: HOSPITALIST

## 2018-03-27 PROCEDURE — 80329 ANALGESICS NON-OPIOID 1 OR 2: CPT

## 2018-03-27 PROCEDURE — 25000003 PHARM REV CODE 250: Performed by: EMERGENCY MEDICINE

## 2018-03-27 PROCEDURE — 96375 TX/PRO/DX INJ NEW DRUG ADDON: CPT

## 2018-03-27 PROCEDURE — 96361 HYDRATE IV INFUSION ADD-ON: CPT

## 2018-03-27 PROCEDURE — 96376 TX/PRO/DX INJ SAME DRUG ADON: CPT

## 2018-03-27 RX ORDER — LORAZEPAM 0.5 MG/1
2 TABLET ORAL EVERY 8 HOURS PRN
Status: CANCELLED | OUTPATIENT
Start: 2018-03-27 | End: 2018-03-28

## 2018-03-27 RX ORDER — PANTOPRAZOLE SODIUM 40 MG/1
40 TABLET, DELAYED RELEASE ORAL DAILY
Status: DISCONTINUED | OUTPATIENT
Start: 2018-03-28 | End: 2018-03-28 | Stop reason: HOSPADM

## 2018-03-27 RX ORDER — DIPHENHYDRAMINE HYDROCHLORIDE 50 MG/ML
25 INJECTION INTRAMUSCULAR; INTRAVENOUS
Status: COMPLETED | OUTPATIENT
Start: 2018-03-27 | End: 2018-03-27

## 2018-03-27 RX ORDER — LORAZEPAM 2 MG/ML
1 INJECTION INTRAMUSCULAR
Status: COMPLETED | OUTPATIENT
Start: 2018-03-27 | End: 2018-03-27

## 2018-03-27 RX ORDER — ENOXAPARIN SODIUM 100 MG/ML
1 INJECTION SUBCUTANEOUS ONCE
Status: COMPLETED | OUTPATIENT
Start: 2018-03-28 | End: 2018-03-28

## 2018-03-27 RX ORDER — GABAPENTIN 100 MG/1
200 CAPSULE ORAL 2 TIMES DAILY
Status: DISCONTINUED | OUTPATIENT
Start: 2018-03-27 | End: 2018-03-28 | Stop reason: HOSPADM

## 2018-03-27 RX ORDER — ONDANSETRON 8 MG/1
8 TABLET, ORALLY DISINTEGRATING ORAL EVERY 6 HOURS PRN
Status: DISCONTINUED | OUTPATIENT
Start: 2018-03-27 | End: 2018-03-28 | Stop reason: HOSPADM

## 2018-03-27 RX ORDER — ENOXAPARIN SODIUM 100 MG/ML
1 INJECTION SUBCUTANEOUS
Status: COMPLETED | OUTPATIENT
Start: 2018-03-27 | End: 2018-03-27

## 2018-03-27 RX ORDER — TOPIRAMATE 100 MG/1
100 TABLET, FILM COATED ORAL DAILY
Status: DISCONTINUED | OUTPATIENT
Start: 2018-03-27 | End: 2018-03-28 | Stop reason: HOSPADM

## 2018-03-27 RX ORDER — LORAZEPAM 2 MG/ML
2 INJECTION INTRAMUSCULAR
Status: DISCONTINUED | OUTPATIENT
Start: 2018-03-27 | End: 2018-03-28

## 2018-03-27 RX ORDER — ENOXAPARIN SODIUM 100 MG/ML
1 INJECTION SUBCUTANEOUS
Status: DISCONTINUED | OUTPATIENT
Start: 2018-03-27 | End: 2018-03-27

## 2018-03-27 RX ORDER — ENOXAPARIN SODIUM 100 MG/ML
1 INJECTION SUBCUTANEOUS
Status: DISCONTINUED | OUTPATIENT
Start: 2018-03-28 | End: 2018-03-27

## 2018-03-27 RX ORDER — ALPRAZOLAM 0.5 MG/1
1 TABLET ORAL 2 TIMES DAILY PRN
Status: DISCONTINUED | OUTPATIENT
Start: 2018-03-27 | End: 2018-03-28 | Stop reason: HOSPADM

## 2018-03-27 RX ORDER — PROCHLORPERAZINE EDISYLATE 5 MG/ML
10 INJECTION INTRAMUSCULAR; INTRAVENOUS ONCE
Status: COMPLETED | OUTPATIENT
Start: 2018-03-27 | End: 2018-03-27

## 2018-03-27 RX ORDER — ACETAMINOPHEN 325 MG/1
650 TABLET ORAL EVERY 8 HOURS PRN
Status: DISCONTINUED | OUTPATIENT
Start: 2018-03-27 | End: 2018-03-28 | Stop reason: HOSPADM

## 2018-03-27 RX ADMIN — ENOXAPARIN SODIUM 80 MG: 100 INJECTION SUBCUTANEOUS at 10:03

## 2018-03-27 RX ADMIN — DIPHENHYDRAMINE HYDROCHLORIDE 25 MG: 50 INJECTION, SOLUTION INTRAMUSCULAR; INTRAVENOUS at 04:03

## 2018-03-27 RX ADMIN — GABAPENTIN 200 MG: 100 CAPSULE ORAL at 11:03

## 2018-03-27 RX ADMIN — SODIUM CHLORIDE 1000 ML: 0.9 INJECTION, SOLUTION INTRAVENOUS at 04:03

## 2018-03-27 RX ADMIN — LORAZEPAM 1 MG: 2 INJECTION INTRAMUSCULAR; INTRAVENOUS at 10:03

## 2018-03-27 RX ADMIN — PROCHLORPERAZINE EDISYLATE 10 MG: 5 INJECTION INTRAMUSCULAR; INTRAVENOUS at 04:03

## 2018-03-27 RX ADMIN — LORAZEPAM 1 MG: 2 INJECTION INTRAMUSCULAR; INTRAVENOUS at 04:03

## 2018-03-27 RX ADMIN — TOPIRAMATE 100 MG: 100 TABLET, FILM COATED ORAL at 11:03

## 2018-03-27 NOTE — ED PROVIDER NOTES
"Encounter Date: 3/27/2018    SCRIBE #1 NOTE: I, Carlmeena Bowens, am scribing for, and in the presence of, Neal Markham MD. Other sections scribed: HPI, ROS, PE.       History     Chief Complaint   Patient presents with    Seizures     Pt reports seizure today, history of seizures related to traumatic brain injury     CC: Seizure  HPI: This 38 y.o. female smoker with Hx of Pseudotumor cerebri w/  shunt, seizures, migraines, depression, anxiety, hypothyroidism, IDD presents to the ED via EMS for evaluation s/p seizure that occurred at home earlier today. Pt's friend states that pt fell to the ground, at which point her body began to shake and her eyes rolled into the back of her head; friend states that pt remained in this states not responding for a few minutes before regaining consciousness. Pt states that she was unable to see following this even and continues to have "red pulsing auras" in vision of her L eye. Friend took pt to her PCP at 1300 this afternoon, and left there to come here to get a lumbar puncture. Pt states that she is only current taking Neurontin, Topamax, Xanax (last dose this morning), and Tizanidine. Pt states that she is feeling anxious right now. She denies fever, chills, N/V.        The history is provided by the patient and the EMS personnel.     Review of patient's allergies indicates:   Allergen Reactions    Clindamycin Hives    Biaxin [clarithromycin] Hives and Itching    Cephalexin Hives    Sulfa (sulfonamide antibiotics) Hives and Itching     Past Medical History:   Diagnosis Date    Asthma     Depression     GERD (gastroesophageal reflux disease)     Immune deficiency disorder     Migraine headache     Pseudotumor cerebri     Seizures     Thyroid disease      Past Surgical History:   Procedure Laterality Date    APPENDECTOMY      BRAIN SURGERY  2008    2x    FRACTURE SURGERY      KNEE SURGERY      LAPAROSCOPIC GASTRIC BANDING      SHUNT TAP      TONSILLECTOMY   "     Family History   Problem Relation Age of Onset    Deep vein thrombosis Father     Deep vein thrombosis Brother      Social History   Substance Use Topics    Smoking status: Current Every Day Smoker     Types: Cigarettes    Smokeless tobacco: Never Used    Alcohol use Yes      Comment: social     Review of Systems   Constitutional: Negative for chills and fever.   HENT: Negative for rhinorrhea and sore throat.    Eyes: Positive for visual disturbance. Negative for pain.   Respiratory: Negative for shortness of breath.    Cardiovascular: Negative for chest pain.   Gastrointestinal: Negative for vomiting.   Genitourinary: Negative for dysuria and flank pain.   Musculoskeletal: Negative for arthralgias and myalgias.   Skin: Negative for rash.   Allergic/Immunologic: Positive for immunocompromised state (IDD).   Neurological: Positive for seizures.       Physical Exam     Initial Vitals [03/27/18 1538]   BP Pulse Resp Temp SpO2   105/68 70 18 98.1 °F (36.7 °C) 98 %      MAP       80.33         Physical Exam    Nursing note and vitals reviewed.  Constitutional: She appears well-developed and well-nourished.   HENT:   Head: Normocephalic and atraumatic.   Nose: Nose normal.   Mouth/Throat: Oropharynx is clear and moist.   Poor dentition.   Eyes: Conjunctivae and EOM are normal. Pupils are equal, round, and reactive to light.   Visual fields intact to confrontation. Visual acuity 20/20 OS, 20/25 OD.   Neck: Normal range of motion. Neck supple.   Cardiovascular: Normal rate, regular rhythm and normal heart sounds.   Pulmonary/Chest: Breath sounds normal.   Abdominal: Soft. Bowel sounds are normal.   Musculoskeletal: Normal range of motion.   Neurological: She is alert and oriented to person, place, and time. She has normal strength.   Skin: Skin is warm and dry.   Psychiatric: Her mood appears anxious.         ED Course   Procedures  Labs Reviewed   CBC W/ AUTO DIFFERENTIAL - Abnormal; Notable for the following:         Result Value    RDW 15.0 (*)     All other components within normal limits   COMPREHENSIVE METABOLIC PANEL - Abnormal; Notable for the following:     Chloride 111 (*)     Anion Gap 5 (*)     All other components within normal limits   ACETAMINOPHEN LEVEL - Abnormal; Notable for the following:     Acetaminophen (Tylenol), Serum <3.0 (*)     All other components within normal limits   SALICYLATE LEVEL - Abnormal; Notable for the following:     Salicylate Lvl <5.0 (*)     All other components within normal limits   D DIMER, QUANTITATIVE - Abnormal; Notable for the following:     D-Dimer 0.72 (*)     All other components within normal limits   POCT GLUCOSE - Abnormal; Notable for the following:     POCT Glucose 65 (*)     All other components within normal limits   ALCOHOL,MEDICAL (ETHANOL)   DRUG SCREEN PANEL, URINE EMERGENCY   POCT URINE PREGNANCY   POCT GLUCOSE MONITORING CONTINUOUS     EKG Readings: (Independently Interpreted)   Rhythm: Normal Sinus Rhythm. Heart Rate: 60. Ectopy: No Ectopy. Conduction: Normal. ST Segments: Normal ST Segments. T Waves: Normal. Clinical Impression: Normal Sinus Rhythm Other Impression: rightward axis          Medical Decision Making:   Clinical Tests:   Lab Tests: Reviewed  ED Management:  Results for GLO ANGUIANO (MRN [id number]) as of 3/27/2018 18:05    3/27/2018 16:24  WBC: 7.22  RBC: 4.07  Hemoglobin: 12.6  Hematocrit: 38.1  MCV: 94  MCH: 31.0  MCHC: 33.1  RDW: 15.0 (H)  Platelets: 253  MPV: 10.1  Gran%: 55.8  Gran # (ANC): 4.0  Lymph%: 26.5  Lymph #: 1.9  Mono%: 11.9  Mono #: 0.9  Eosinophil%: 4.0  Eos #: 0.3  Basophil%: 1.5  Baso #: 0.11    D-Dimer: 0.72 (h)    Sodium: 141  Potassium: 4.4  Chloride: 111 (H)  CO2: 25  Anion Gap: 5 (L)  BUN, Bld: 18  Creatinine: 1.0  eGFR if non : >60  eGFR if African American: >60  Glucose: 81  Calcium: 8.9  Alkaline Phosphatase: 59  Total Protein: 6.1  Albumin: 3.6  Total Bilirubin: 0.2  AST: 16  ALT: 12  Acetaminophen  (Tylenol), Serum: <3.0 (L)  Salicylate Lvl: <5.0 (L)  Alcohol, Medical, Serum: <10    3/27/2018 16:50  Benzodiazepines: Presumptive Positive  Methadone metabolites: Negative  Phencyclidine: Negative  Cocaine (Metab.): Negative  Opiate Scrn, Ur: Negative  Barbiturate Screen, Ur: Negative  Amphetamine Screen, Ur: Negative  Marijuana (THC) Metabolite: Presumptive Positive    3/27/2018 16:59  Preg Test, Ur: Negative   Acceptable: Yes  EXAMINATION:  CT HEAD WITHOUT CONTRAST    CLINICAL HISTORY:  Seizures new or progressive;    TECHNIQUE:  Low dose axial images were obtained through the head.  Coronal and sagittal reformations were also performed. Contrast was not administered.    COMPARISON:  CT head October 2017.    FINDINGS:  Stable right frontal coursing  shunt catheter is visualized.  Ventriculostomy catheter enters the anterior horn of the right lateral ventricle with distal tip terminating at midline in the region of the 3rd ventricle, unchanged.  There is stable slit-like configuration of the ventricular system.  No evidence of acute/recent major vascular distribution cerebral infarction, intraparenchymal hemorrhage, or intra-axial space occupying lesion. No effacement of the skull-base cisterns. No abnormal extra-axial fluid collections or blood products. The paranasal sinuses and mastoid air cells are unremarkable. The calvarium shows no significant abnormality.  Impression       No acute intracranial abnormalities identified.    Right frontal coursing  shunt catheter with stable slit-like appearance of the ventricular system which could potentially reflect over shunting, however the appearance is unchanged over multiple prior CT exams.  No evidence of hydrocephalus.      Electronically signed by: Shanti Tong MD  Date: 03/27/2018  Time: 17:14   D-Dimer was elevated and CTA was ordered, however all  that could be inserted antecubitally was a 20 g catheter, and the CT tech refused to do a CTA  with anything less than an 18 g antecubital catheter. A V/Q scan was ordered but there is no isotope in the hospital to do it now so the patient will have to wait until the am to get it done. Meanwhile the  shunt is clinically working so I will not do an Lp (despite the patient's request), as she has already been treated for IIH.    2038h Radames Nonakellie admitted the patient.              Scribe Attestation:   Scribe #1: I performed the above scribed service and the documentation accurately describes the services I performed. I attest to the accuracy of the note.    Attending Attestation:           Physician Attestation for Scribe:  Physician Attestation Statement for Scribe #1: I, Neal Markham MD, reviewed documentation, as scribed by Carl Bowens in my presence, and it is both accurate and complete.                    Clinical Impression:   The primary encounter diagnosis was Syncope and collapse. A diagnosis of Syncope was also pertinent to this visit.    Disposition:   Disposition: Placed in Observation                        Neal Markham MD  03/27/18 2047

## 2018-03-27 NOTE — ED TRIAGE NOTES
"Patient reports episodes of temporary blindness and falling and hitting head.  Friend describes the episode as patient "falling and shaking with her eyes rolling back into her head."  Hx of pseudotumor cerebri  "

## 2018-03-28 VITALS
TEMPERATURE: 97 F | RESPIRATION RATE: 18 BRPM | HEART RATE: 64 BPM | OXYGEN SATURATION: 99 % | SYSTOLIC BLOOD PRESSURE: 129 MMHG | DIASTOLIC BLOOD PRESSURE: 76 MMHG | HEIGHT: 66 IN | BODY MASS INDEX: 28.34 KG/M2 | WEIGHT: 176.38 LBS

## 2018-03-28 PROBLEM — R55 SYNCOPE AND COLLAPSE: Status: RESOLVED | Noted: 2018-03-27 | Resolved: 2018-03-28

## 2018-03-28 LAB
ALBUMIN SERPL BCP-MCNC: 3.2 G/DL
ALP SERPL-CCNC: 51 U/L
ALT SERPL W/O P-5'-P-CCNC: 13 U/L
ANION GAP SERPL CALC-SCNC: 4 MMOL/L
AST SERPL-CCNC: 16 U/L
BASOPHILS # BLD AUTO: 0.06 K/UL
BASOPHILS NFR BLD: 1 %
BILIRUB SERPL-MCNC: 0.4 MG/DL
BILIRUB UR QL STRIP: NEGATIVE
BUN SERPL-MCNC: 17 MG/DL
CALCIUM SERPL-MCNC: 8.4 MG/DL
CHLORIDE SERPL-SCNC: 113 MMOL/L
CLARITY UR: CLEAR
CO2 SERPL-SCNC: 23 MMOL/L
COLOR UR: ABNORMAL
CREAT SERPL-MCNC: 0.9 MG/DL
DIFFERENTIAL METHOD: ABNORMAL
EOSINOPHIL # BLD AUTO: 0.3 K/UL
EOSINOPHIL NFR BLD: 4.3 %
ERYTHROCYTE [DISTWIDTH] IN BLOOD BY AUTOMATED COUNT: 14.7 %
EST. GFR  (AFRICAN AMERICAN): >60 ML/MIN/1.73 M^2
EST. GFR  (NON AFRICAN AMERICAN): >60 ML/MIN/1.73 M^2
GLUCOSE SERPL-MCNC: 97 MG/DL
GLUCOSE UR QL STRIP: NEGATIVE
HCT VFR BLD AUTO: 38.1 %
HGB BLD-MCNC: 12.4 G/DL
HGB UR QL STRIP: ABNORMAL
KETONES UR QL STRIP: NEGATIVE
LEUKOCYTE ESTERASE UR QL STRIP: NEGATIVE
LYMPHOCYTES # BLD AUTO: 2.4 K/UL
LYMPHOCYTES NFR BLD: 40 %
MCH RBC QN AUTO: 30.3 PG
MCHC RBC AUTO-ENTMCNC: 32.5 G/DL
MCV RBC AUTO: 93 FL
MICROSCOPIC COMMENT: NORMAL
MONOCYTES # BLD AUTO: 0.7 K/UL
MONOCYTES NFR BLD: 11.2 %
NEUTROPHILS # BLD AUTO: 2.6 K/UL
NEUTROPHILS NFR BLD: 43.5 %
NITRITE UR QL STRIP: NEGATIVE
PH UR STRIP: 7 [PH] (ref 5–8)
PLATELET # BLD AUTO: 240 K/UL
PMV BLD AUTO: 10.4 FL
POTASSIUM SERPL-SCNC: 3.9 MMOL/L
PROT SERPL-MCNC: 5.4 G/DL
PROT UR QL STRIP: NEGATIVE
RBC # BLD AUTO: 4.09 M/UL
RBC #/AREA URNS HPF: 0 /HPF (ref 0–4)
SODIUM SERPL-SCNC: 140 MMOL/L
SP GR UR STRIP: 1.01 (ref 1–1.03)
T4 FREE SERPL-MCNC: 0.73 NG/DL
TSH SERPL DL<=0.005 MIU/L-ACNC: 5.12 UIU/ML
URN SPEC COLLECT METH UR: ABNORMAL
UROBILINOGEN UR STRIP-ACNC: NEGATIVE EU/DL
WBC # BLD AUTO: 6 K/UL

## 2018-03-28 PROCEDURE — 93005 ELECTROCARDIOGRAM TRACING: CPT

## 2018-03-28 PROCEDURE — 81000 URINALYSIS NONAUTO W/SCOPE: CPT

## 2018-03-28 PROCEDURE — 85025 COMPLETE CBC W/AUTO DIFF WBC: CPT

## 2018-03-28 PROCEDURE — 25000003 PHARM REV CODE 250: Performed by: EMERGENCY MEDICINE

## 2018-03-28 PROCEDURE — 80053 COMPREHEN METABOLIC PANEL: CPT

## 2018-03-28 PROCEDURE — 99203 OFFICE O/P NEW LOW 30 MIN: CPT | Mod: ,,, | Performed by: PSYCHIATRY & NEUROLOGY

## 2018-03-28 PROCEDURE — 93010 ELECTROCARDIOGRAM REPORT: CPT | Mod: ,,, | Performed by: INTERNAL MEDICINE

## 2018-03-28 PROCEDURE — 25000003 PHARM REV CODE 250: Performed by: HOSPITALIST

## 2018-03-28 PROCEDURE — 80201 ASSAY OF TOPIRAMATE: CPT

## 2018-03-28 PROCEDURE — 36415 COLL VENOUS BLD VENIPUNCTURE: CPT

## 2018-03-28 PROCEDURE — G0378 HOSPITAL OBSERVATION PER HR: HCPCS

## 2018-03-28 PROCEDURE — 84443 ASSAY THYROID STIM HORMONE: CPT

## 2018-03-28 PROCEDURE — 63600175 PHARM REV CODE 636 W HCPCS: Performed by: NURSE PRACTITIONER

## 2018-03-28 PROCEDURE — 84439 ASSAY OF FREE THYROXINE: CPT

## 2018-03-28 PROCEDURE — 63600175 PHARM REV CODE 636 W HCPCS: Performed by: EMERGENCY MEDICINE

## 2018-03-28 RX ORDER — CLONAZEPAM 0.5 MG/1
0.5 TABLET ORAL 2 TIMES DAILY
Status: DISCONTINUED | OUTPATIENT
Start: 2018-03-28 | End: 2018-03-28 | Stop reason: HOSPADM

## 2018-03-28 RX ORDER — LORAZEPAM 2 MG/ML
1 INJECTION INTRAMUSCULAR ONCE
Status: COMPLETED | OUTPATIENT
Start: 2018-03-28 | End: 2018-03-28

## 2018-03-28 RX ADMIN — LORAZEPAM 1 MG: 2 INJECTION INTRAMUSCULAR; INTRAVENOUS at 02:03

## 2018-03-28 RX ADMIN — PANTOPRAZOLE SODIUM 40 MG: 40 TABLET, DELAYED RELEASE ORAL at 08:03

## 2018-03-28 RX ADMIN — ENOXAPARIN SODIUM 80 MG: 100 INJECTION SUBCUTANEOUS at 08:03

## 2018-03-28 RX ADMIN — GABAPENTIN 200 MG: 100 CAPSULE ORAL at 08:03

## 2018-03-28 RX ADMIN — ACETAMINOPHEN 650 MG: 325 TABLET ORAL at 02:03

## 2018-03-28 RX ADMIN — TOPIRAMATE 100 MG: 100 TABLET, FILM COATED ORAL at 08:03

## 2018-03-28 RX ADMIN — ALPRAZOLAM 1 MG: 0.5 TABLET ORAL at 12:03

## 2018-03-28 NOTE — CONSULTS
[facility]  Neurosurgery    SUBJECTIVE:     History of Present Illness:  Nany Rogers is a 38 y.o. female with pseudotumor cerebri s/p  shunt placement in ~1999, thyroid disease, migraines and depression who presented to [facility] ED yesterday following 4 seizures in a two day period. The patient recently went without health insurance until acquiring medicaid one month ago. She resumed Topiramate ~1 week ago.   Neurosurgery was consulted to assess pseudotumor cerebri and status of  shunt.     She reports gait disturbance at home along with vision changes, including loss of peripheral vision (R>L), intermittent blurry vision, and intermittent double vision of the right eye that is increased with increased stress. The vision changes have been present for many years and seems to have worsened over the years. Denies significant acute worsening. She also endorses daily headaches that have been present since before her  shunt was placed. She has had multiple falls at home that she attributes to gait and/or vision changes. CT head taken yesterday shows possible over-shunting, though stable appearance as compared to previous imaging.     PTA Medications   Medication Sig    alprazolam (XANAX) 2 MG Tab Take 2 mg by mouth.    gabapentin (NEURONTIN) 100 MG capsule Take 200 mg by mouth 2 (two) times daily.    ibuprofen (ADVIL,MOTRIN) 200 MG tablet Take 200 mg by mouth every 6 (six) hours as needed for Pain.    topiramate (TOPAMAX) 100 MG tablet Take by mouth.    [DISCONTINUED] tizanidine 4 mg Cap Take by mouth.    omeprazole (PRILOSEC) 20 MG capsule Take 20 mg by mouth once daily.    ondansetron (ZOFRAN-ODT) 8 MG TbDL Take 1 tablet (8 mg total) by mouth every 6 (six) hours as needed.    [DISCONTINUED] dextroamphetamine-amphetamine (ADDERALL XR) 30 MG 24 hr capsule Take 30 mg by mouth every morning.    [DISCONTINUED] lansoprazole (PREVACID) 15 MG capsule Take by mouth.    [DISCONTINUED]  levoFLOXacin (LEVAQUIN) 750 MG tablet Take 1 tablet (750 mg total) by mouth once daily.    [DISCONTINUED] LURASIDONE HCL (LATUDA ORAL) Take by mouth.    [DISCONTINUED] oxycodone-acetaminophen (PERCOCET) 5-325 mg per tablet Take 1-2 tablets by mouth every 4 (four) hours as needed for Pain.    [DISCONTINUED] ranitidine (ZANTAC) 300 MG tablet Take 300 mg by mouth.       Review of patient's allergies indicates:   Allergen Reactions    Clindamycin Hives    Biaxin [clarithromycin] Hives and Itching    Cephalexin Hives    Sulfa (sulfonamide antibiotics) Hives and Itching       Past Medical History:   Diagnosis Date    Asthma     Depression     GERD (gastroesophageal reflux disease)     Immune deficiency disorder     Migraine headache     Pseudotumor cerebri     Seizures     Thyroid disease      Past Surgical History:   Procedure Laterality Date    APPENDECTOMY      BRAIN SURGERY  2008    2x    FRACTURE SURGERY      KNEE SURGERY      LAPAROSCOPIC GASTRIC BANDING      SHUNT TAP      TONSILLECTOMY       Family History   Problem Relation Age of Onset    Deep vein thrombosis Father     Deep vein thrombosis Brother      Social History   Substance Use Topics    Smoking status: Current Every Day Smoker     Types: Cigarettes    Smokeless tobacco: Never Used    Alcohol use Yes      Comment: social        Review of Systems:  Constitutional: no fever or chills  Eyes: vision changes as noted in HPI  ENT: no nasal congestion or sore throat  Respiratory: no cough or shortness of breath  Cardiovascular: no chest pain or palpitations  Gastrointestinal: no nausea or vomiting, tolerating diet  Genitourinary: no hematuria or dysuria  Integument/Breast: no rash or pruritis  Hematologic/Lymphatic: no easy bruising or lymphadenopathy  Musculoskeletal: positive for left knee pain  Neurological: positive for seizures, headaches  Behavioral/Psych: no auditory or visual hallucinations  Endocrine: no heat or cold  intolerance    OBJECTIVE:     Vital Signs (Most Recent):  Temp: 97.3 °F (36.3 °C) (03/28/18 1115)  Pulse: 64 (03/28/18 1115)  Resp: 18 (03/28/18 1115)  BP: 129/76 (03/28/18 1115)  SpO2: 99 % (03/28/18 1115)    Physical Exam:  General: well developed, well nourished, mild distress  Head: normocephalic, atraumatic  Neurologic: Alert and oriented. Thought content appropriate  GCS: Motor: 6/Verbal: 5/Eyes: 4 GCS Total: 15  Language: No aphasia  Speech: No dysarthria  Cranial nerves: face symmetric, tongue midline, CN II-XII grossly intact.   Eyes: pupils equal, round, reactive to light with accommodation, EOMI.   Pulmonary: normal respirations, not labored, no accessory muscles used  Sensory: intact to light touch throughout  Motor Strength: Moves all extremities spontaneously with good tone.  Full strength upper and lower extremities. No abnormal movements seen.   Galvez: absent  Gait: normal  Tandem Gait: No difficulty     Cervical ROM: full   Pronator drift: negative  Finger-to-nose: intact bilaterally  Peripheral vision decreased on R compared to left, notably RUQ.   Left horizontal nystagmus    Diagnostic Results:  I have personally reviewed imaging and agree with the findings.     CT Head (3/27/18)  -No acute intracranial abnormalities identified.  -Right frontal coursing  shunt catheter with stable slit-like appearance of the ventricular system which could potentially reflect over shunting, however the appearance is unchanged over multiple prior CT exams.  No evidence of hydrocephalus.    ASSESSMENT/PLAN:     Nany Rogers is a 38 y.o. female with pseudotumor cerebri, thyroid disease, migraines and depression who presented to [facility] ED yesterday following 4 seizures in a two day period. Neurosurgery was consulted to evaluate  shunt and h/o pseudotumor cerebri. CT head appears stable compared to previous imaging. Pt is anxious to go home, but is also concerned with establishing outpatient care. Primary  team is referring patient to [facility] ([facility] which will accept Medicaid, and she can follow-up with Dr. Townsend on the [facility].      She complains of chronic vision changes including decreased peripheral vision (R>L), intermittent double vision, and intermittent blurry vision. Though vision problems have been progressively worsening over the years, they do not appear to have acutely worsened. Recommend outpatient follow-up with ophthalmology.    No acute neurosurgical intervention recommended at this time.  Recommend follow-up in neurosurgery clinic in 2-4 weeks.         Please feel free to call with any further questions        Davida Vera PA-C  [facility]  Department of Neurosurgery  [phone]

## 2018-03-28 NOTE — ASSESSMENT & PLAN NOTE
Likely medication effect, illicit substance effect, breakthrough seizure, or a combination.  She is a poor historian.  Continue home gabapentin and topiramate with PRN lorazepam for seizures and consult Neurology.  Stop tizanidine, oxycodone, and adderall xr.

## 2018-03-28 NOTE — H&P
[facility] Hospital Medicine  History & Physical    Patient Name: Nany Rogers  MRN: [id number]  Admission Date: 3/27/2018  Attending Physician: Anny Tai MD  Primary Care Provider: Meredith Albrecht MD         Patient information was obtained from patient, spouse/SO and ER records.     Subjective:     Principal Problem:Syncope and collapse    Chief Complaint:   Chief Complaint   Patient presents with    Seizures     Pt reports seizure today, history of seizures related to traumatic brain injury        HPI: 38 y.o. female with seizure disorder, pseudotumor cerebri s/p shunt placement, depression, migraine headaches, and thyroid disease presents with a complaint of breakthrough seizure.  Her partner witness an episode of unresponsiveness during which her eyes rolled back and she had generalized convulsive movements.  Afterwards she was drowsy with no recollection of the incident, duration roughly 3 minutes.  No loss of bladder or bowel control.  She has been off of all her medications for months due to lack of insurance and cost, recently resumed medications last week.  She also drank alcohol heavily up until a few weeks ago and uses marijuana almost daily.  Denies fever, chills, vision changes, cough, SOB, chest pain, palpitations, peripheral edema, dizziness, abdominal pain, nausea, vomiting, diarrhea, dysuria, frequency, or urgency.  Work up largely unremarkable, save for an elevated D-dimer.  Large bore IV access unable to be obtained for CTA chest, V/Q scan ordered for AM.  Placed in observation for further monitoring while awaiting V/Q scan.    Past Medical History:   Diagnosis Date    Asthma     Depression     GERD (gastroesophageal reflux disease)     Immune deficiency disorder     Migraine headache     Pseudotumor cerebri     Seizures     Thyroid disease        Past Surgical History:   Procedure Laterality Date    APPENDECTOMY      BRAIN SURGERY  2008    2x    FRACTURE SURGERY       KNEE SURGERY      LAPAROSCOPIC GASTRIC BANDING      SHUNT TAP      TONSILLECTOMY         Review of patient's allergies indicates:   Allergen Reactions    Clindamycin Hives    Biaxin [clarithromycin] Hives and Itching    Cephalexin Hives    Sulfa (sulfonamide antibiotics) Hives and Itching       No current facility-administered medications on file prior to encounter.      Current Outpatient Prescriptions on File Prior to Encounter   Medication Sig    alprazolam (XANAX) 2 MG Tab Take 2 mg by mouth.    gabapentin (NEURONTIN) 100 MG capsule Take 200 mg by mouth 2 (two) times daily.    ibuprofen (ADVIL,MOTRIN) 200 MG tablet Take 200 mg by mouth every 6 (six) hours as needed for Pain.    tizanidine 4 mg Cap Take by mouth.    topiramate (TOPAMAX) 100 MG tablet Take by mouth.    dextroamphetamine-amphetamine (ADDERALL XR) 30 MG 24 hr capsule Take 30 mg by mouth every morning.    lansoprazole (PREVACID) 15 MG capsule Take by mouth.    levoFLOXacin (LEVAQUIN) 750 MG tablet Take 1 tablet (750 mg total) by mouth once daily.    LURASIDONE HCL (LATUDA ORAL) Take by mouth.    omeprazole (PRILOSEC) 20 MG capsule Take 20 mg by mouth once daily.    ondansetron (ZOFRAN-ODT) 8 MG TbDL Take 1 tablet (8 mg total) by mouth every 6 (six) hours as needed.    oxycodone-acetaminophen (PERCOCET) 5-325 mg per tablet Take 1-2 tablets by mouth every 4 (four) hours as needed for Pain.    ranitidine (ZANTAC) 300 MG tablet Take 300 mg by mouth.     Family History     Problem Relation (Age of Onset)    Deep vein thrombosis Father, Brother        Social History Main Topics    Smoking status: Current Every Day Smoker     Types: Cigarettes    Smokeless tobacco: Never Used    Alcohol use Yes      Comment: social    Drug use: Yes     Types: Marijuana      Comment: daily    Sexual activity: Not on file     Review of Systems   Constitutional: Negative for chills, diaphoresis, fatigue and fever.   Eyes: Negative for photophobia  and visual disturbance.   Respiratory: Negative for cough and shortness of breath.    Cardiovascular: Negative for chest pain, palpitations and leg swelling.   Gastrointestinal: Negative for abdominal pain, diarrhea, nausea and vomiting.   Genitourinary: Negative for dysuria, flank pain, frequency, pelvic pain and urgency.   Musculoskeletal: Negative for gait problem, neck pain and neck stiffness.   Skin: Negative for pallor, rash and wound.   Neurological: Positive for seizures and headaches. Negative for dizziness, tremors, syncope, facial asymmetry, speech difficulty, weakness, light-headedness and numbness.   Psychiatric/Behavioral: Negative for confusion and decreased concentration.     Objective:     Vital Signs (Most Recent):  Temp: 98.1 °F (36.7 °C) (03/27/18 1538)  Pulse: (!) 56 (03/27/18 2016)  Resp: 19 (03/27/18 2016)  BP: 106/68 (03/27/18 2016)  SpO2: 97 % (03/27/18 2016) Vital Signs (24h Range):  Temp:  [98.1 °F (36.7 °C)] 98.1 °F (36.7 °C)  Pulse:  [51-70] 56  Resp:  [16-24] 19  SpO2:  [94 %-100 %] 97 %  BP: ()/(55-85) 106/68     Weight: 79.4 kg (175 lb)  Body mass index is 28.25 kg/m².    Physical Exam   Constitutional: She is oriented to person, place, and time. She appears well-developed and well-nourished. She is sleeping. She is easily aroused.  Non-toxic appearance. She does not appear ill. No distress.   HENT:   Head: Normocephalic and atraumatic.   Right Ear: External ear normal.   Left Ear: External ear normal.   Nose: Nose normal.   Mouth/Throat: Oropharynx is clear and moist.   Eyes: Conjunctivae and EOM are normal. Pupils are equal, round, and reactive to light.   Neck: Normal range of motion. Neck supple.   Cardiovascular: Normal rate, regular rhythm and intact distal pulses.    Pulmonary/Chest: Effort normal and breath sounds normal. No respiratory distress. She has no wheezes.   Abdominal: Soft. Bowel sounds are normal. She exhibits no distension. There is no tenderness.   No  palpable hepatomegaly or splenomegaly    Musculoskeletal: Normal range of motion. She exhibits no edema or tenderness.   Neurological: She is oriented to person, place, and time and easily aroused.   She is drowsy and does not comply with directions for neurological exam.   Skin: Skin is warm and dry.   Psychiatric: She has a normal mood and affect. Thought content normal.   Nursing note and vitals reviewed.        CRANIAL NERVES     CN III, IV, VI   Pupils are equal, round, and reactive to light.  Extraocular motions are normal.        Significant Labs: All pertinent labs within the past 24 hours have been reviewed.    Significant Imaging: I have reviewed and interpreted all pertinent imaging results/findings within the past 24 hours.    Assessment/Plan:     * Syncope and collapse    Likely medication effect, illicit substance effect, breakthrough seizure, or a combination.  She is a poor historian.  Continue home gabapentin and topiramate with PRN lorazepam for seizures and consult Neurology.  Stop tizanidine, oxycodone, and adderall xr.        Migraine headache    Chronic and poorly controlled, relieved by compazine and benadryl in ED        Depression    Poorly controlled, continue home lurasidone and PRN alprazolam        Pseudotumor cerebri    S/p shunt placement which appears functional, stable.        Thyroid disease    Check TSH          VTE Risk Mitigation         Ordered     enoxaparin injection 80 mg  Once     Route:  Subcutaneous        03/27/18 2049        Radames Crystal Jr., APRN, AGACNP-BC  Hospitalist - Department of Hospital Medicine  [facility] [address] [address] [place] [id number]  Office #: [phone]; Pager #: [phone]

## 2018-03-28 NOTE — SUBJECTIVE & OBJECTIVE
Past Medical History:   Diagnosis Date    Asthma     Depression     GERD (gastroesophageal reflux disease)     Immune deficiency disorder     Migraine headache     Pseudotumor cerebri     Seizures     Thyroid disease        Past Surgical History:   Procedure Laterality Date    APPENDECTOMY      BRAIN SURGERY  2008    2x    FRACTURE SURGERY      KNEE SURGERY      LAPAROSCOPIC GASTRIC BANDING      SHUNT TAP      TONSILLECTOMY         Review of patient's allergies indicates:   Allergen Reactions    Clindamycin Hives    Biaxin [clarithromycin] Hives and Itching    Cephalexin Hives    Sulfa (sulfonamide antibiotics) Hives and Itching       No current facility-administered medications on file prior to encounter.      Current Outpatient Prescriptions on File Prior to Encounter   Medication Sig    alprazolam (XANAX) 2 MG Tab Take 2 mg by mouth.    gabapentin (NEURONTIN) 100 MG capsule Take 200 mg by mouth 2 (two) times daily.    ibuprofen (ADVIL,MOTRIN) 200 MG tablet Take 200 mg by mouth every 6 (six) hours as needed for Pain.    tizanidine 4 mg Cap Take by mouth.    topiramate (TOPAMAX) 100 MG tablet Take by mouth.    dextroamphetamine-amphetamine (ADDERALL XR) 30 MG 24 hr capsule Take 30 mg by mouth every morning.    lansoprazole (PREVACID) 15 MG capsule Take by mouth.    levoFLOXacin (LEVAQUIN) 750 MG tablet Take 1 tablet (750 mg total) by mouth once daily.    LURASIDONE HCL (LATUDA ORAL) Take by mouth.    omeprazole (PRILOSEC) 20 MG capsule Take 20 mg by mouth once daily.    ondansetron (ZOFRAN-ODT) 8 MG TbDL Take 1 tablet (8 mg total) by mouth every 6 (six) hours as needed.    oxycodone-acetaminophen (PERCOCET) 5-325 mg per tablet Take 1-2 tablets by mouth every 4 (four) hours as needed for Pain.    ranitidine (ZANTAC) 300 MG tablet Take 300 mg by mouth.     Family History     Problem Relation (Age of Onset)    Deep vein thrombosis Father, Brother        Social History Main Topics     Smoking status: Current Every Day Smoker     Types: Cigarettes    Smokeless tobacco: Never Used    Alcohol use Yes      Comment: social    Drug use: Yes     Types: Marijuana      Comment: daily    Sexual activity: Not on file     Review of Systems   Constitutional: Negative for chills, diaphoresis, fatigue and fever.   Eyes: Negative for photophobia and visual disturbance.   Respiratory: Negative for cough and shortness of breath.    Cardiovascular: Negative for chest pain, palpitations and leg swelling.   Gastrointestinal: Negative for abdominal pain, diarrhea, nausea and vomiting.   Genitourinary: Negative for dysuria, flank pain, frequency, pelvic pain and urgency.   Musculoskeletal: Negative for gait problem, neck pain and neck stiffness.   Skin: Negative for pallor, rash and wound.   Neurological: Positive for seizures and headaches. Negative for dizziness, tremors, syncope, facial asymmetry, speech difficulty, weakness, light-headedness and numbness.   Psychiatric/Behavioral: Negative for confusion and decreased concentration.     Objective:     Vital Signs (Most Recent):  Temp: 98.1 °F (36.7 °C) (03/27/18 1538)  Pulse: (!) 56 (03/27/18 2016)  Resp: 19 (03/27/18 2016)  BP: 106/68 (03/27/18 2016)  SpO2: 97 % (03/27/18 2016) Vital Signs (24h Range):  Temp:  [98.1 °F (36.7 °C)] 98.1 °F (36.7 °C)  Pulse:  [51-70] 56  Resp:  [16-24] 19  SpO2:  [94 %-100 %] 97 %  BP: ()/(55-85) 106/68     Weight: 79.4 kg (175 lb)  Body mass index is 28.25 kg/m².    Physical Exam   Constitutional: She is oriented to person, place, and time. She appears well-developed and well-nourished. She is sleeping. She is easily aroused.  Non-toxic appearance. She does not appear ill. No distress.   HENT:   Head: Normocephalic and atraumatic.   Right Ear: External ear normal.   Left Ear: External ear normal.   Nose: Nose normal.   Mouth/Throat: Oropharynx is clear and moist.   Eyes: Conjunctivae and EOM are normal. Pupils are equal,  round, and reactive to light.   Neck: Normal range of motion. Neck supple.   Cardiovascular: Normal rate, regular rhythm and intact distal pulses.    Pulmonary/Chest: Effort normal and breath sounds normal. No respiratory distress. She has no wheezes.   Abdominal: Soft. Bowel sounds are normal. She exhibits no distension. There is no tenderness.   No palpable hepatomegaly or splenomegaly    Musculoskeletal: Normal range of motion. She exhibits no edema or tenderness.   Neurological: She is oriented to person, place, and time and easily aroused.   She is drowsy and does not comply with directions for neurological exam.   Skin: Skin is warm and dry.   Psychiatric: She has a normal mood and affect. Thought content normal.   Nursing note and vitals reviewed.        CRANIAL NERVES     CN III, IV, VI   Pupils are equal, round, and reactive to light.  Extraocular motions are normal.        Significant Labs: All pertinent labs within the past 24 hours have been reviewed.    Significant Imaging: I have reviewed and interpreted all pertinent imaging results/findings within the past 24 hours.

## 2018-03-28 NOTE — NURSING
Discharge instructions reviewed and given to pt. Pt verbalized understanding. Pt aaox4. resp even and unlabored. In no acute distress. Pt awaiting hospital transport.

## 2018-03-28 NOTE — PROGRESS NOTES
Follow-up Information     [facility] Weiss On 4/2/2018.    Why:  Outpatient Services, follow up with Becky Thompson at 4:00 PM  Contact information:  [address]  [facility] [address] [phone]               PLEASE BRING TO ALL FOLLOW UP APPOINTMENTS:   1) A COPY YOUR DISCHARGE INSTRUCTIONS   2) ALL MEDICINES YOU ARE CURRENTLY TAKING IN THEIR ORIGINAL BOTTLES   3) IDENTIFICATION CARD   4) Social Security card   5) Proof of income, if no income, a letter from the person you live with that supports you       **PLEASE ARRIVE 30 MINUTES AHEAD OF SCHEDULED APPOINTMENT TIME   ++PLEASE CALL 48 HOURS IN ADVANCE IF YOU MUST RESCHEDULE YOUR APPOINTMENT DAY AND/OR TIME   +++Cost of Visit is $25-45 depending on services provided  [facility]    WRITTEN HEALTHCARE AND DISCHARGE INFORMATION                        Help at Home           [phone]  After discharge for assistance [facility] On Call Nurse Care Line 24/7  Assistance    Things You are responsible For To Manage Your Care At Home:  1.    Getting your prescriptions filled   2.    Taking your medications as directed, DO NOT MISS ANY DOSES!  3.    Going to your follow-up doctor appointment. This is important because it  allow the doctor to monitor your progress and determine if  any changes need to made to your treatment plan.     Thank you for choosing [facility] for your care.  Please answer any calls you may receive from [facility] we want to continue to support you as you manage your healthcare needs. [facility] is happy to have the opportunity to serve you.     Sincerely,  Your [facility] Team,  SARAVANAN Suresh; [phone]

## 2018-03-28 NOTE — ED NOTES
Unsuccessful ultrasound IV attempt. Pt will not remain still through attempt, pt states she does not want to be stuck anymore. MD aware.

## 2018-03-28 NOTE — HOSPITAL COURSE
CT completed stable right frontal coursing  shunt catheter noted and no acute intracranial abnormalities identified. Chest xray negative for acute cardiopulmonary findings. While d-dimer elevated at 0.72, VQ scan showed low probability (<20%) of acute pulmonary embolism. Suspected polypharmacy and possible benzo withdrawal contributory as patient reports recently running out of meds. She has been stretching out and weaning.  Neurology consulted and recommended neurosurgery consult prior to discharge. Neurosurgery consulted and agreed with stable findings. Case management also consulted she tells CM she will be seen at [facility] clinic for continued care and has enough seizure and anxiety medications to last until follow up.

## 2018-03-28 NOTE — PLAN OF CARE
TN explained role of discharge planner; explained in detail and provided the Discharge Admission Checklist Brochure. Discussion of who will help manage care ar home (Significant other); importance of taking all meds as prescribed; and keeping all scheduled appts. Pt verbalized an understanding with teach back.    Pt is scheduled with [facility].       03/28/18 0365   Discharge Assessment   Assessment Type Discharge Planning Assessment   Confirmed/corrected address and phone number on facesheet? Yes   Assessment information obtained from? Patient   Expected Length of Stay (days) 2   Communicated expected length of stay with patient/caregiver yes   Prior to hospitilization cognitive status: Alert/Oriented   Prior to hospitalization functional status: Independent   Current cognitive status: Alert/Oriented   Current Functional Status: Independent   Lives With alone   Able to Return to Prior Arrangements yes   Is patient able to care for self after discharge? Yes   Who are your caregiver(s) and their phone number(s)? (Friend, Nara Augustin [phone])   Patient's perception of discharge disposition home or selfcare   Readmission Within The Last 30 Days no previous admission in last 30 days   Patient currently being followed by outpatient case management? No   Patient currently receives any other outside agency services? No   Equipment Currently Used at Home none   Do you have any problems affording any of your prescribed medications? Yes  (Discussed in detail medications and cost; ststed she has enough meds and will discuss with new PCP for refills of Topomax, Gabapentin.)   Is the patient taking medications as prescribed? yes   Transportation Available car  (Pt drives.)   Does the patient receive services at the [facility]? No   Discharge Plan A Home   Patient/Family In Agreement With Plan yes

## 2018-03-28 NOTE — CONSULTS
"[facility]  Neurology  Consult Note    Patient Name: Nayn Rogers  MRN: [id number]  Admission Date: 3/27/2018  Hospital Length of Stay: 0 days  Code Status: Full Code   Attending Provider: Anny Tai MD   Consulting Provider: Srikanth Carpenter MD  Primary Care Physician: Meredith Albrecht MD  Principal Problem:Syncope and collapse    Consults  Subjective:     Chief Complaint: Headaches/Seizures     HPI: 37 y/o femlae with medical Hx as listed below comes to ED after  "pasing out". Her partner states that her eyes rolled back and began to have a generalized tremor. Episode lasted 2-3 minutes with no definite confusion after event. Apparently these episodes have been more frequent in last 1-2 months. Pt attributes them at her V/P shunt probably not working. She has a V/P shunt since 1999? for IIH. This was placed in [place] but she has not followed with her doctors for a long time.  Ms. Rogers states that her headaches has never resolved even after the shunt. According to pt she has been trying to get an appointment with a neurosurgeon for a few months (as she was told the he shunt was not in the right place) but reportedly has been pushed back and last time cancelled after arriving five minutes late. Pt states: no one wants to help".    In the past was in very high doses of acetazolamide but then D/C and put on topiramate. Ms. Rogers was not on any medication as her insurance coverage had ended. Has had Medicaid for one month now.    Past Medical History:   Diagnosis Date    Asthma     Depression     GERD (gastroesophageal reflux disease)     Immune deficiency disorder     Migraine headache     Pseudotumor cerebri     Seizures     Thyroid disease        Past Surgical History:   Procedure Laterality Date    APPENDECTOMY      BRAIN SURGERY  2008    2x    FRACTURE SURGERY      KNEE SURGERY      LAPAROSCOPIC GASTRIC BANDING      SHUNT TAP      TONSILLECTOMY         Review of patient's " allergies indicates:   Allergen Reactions    Clindamycin Hives    Biaxin [clarithromycin] Hives and Itching    Cephalexin Hives    Sulfa (sulfonamide antibiotics) Hives and Itching       Current Neurological Medications:     No current facility-administered medications on file prior to encounter.      Current Outpatient Prescriptions on File Prior to Encounter   Medication Sig    alprazolam (XANAX) 2 MG Tab Take 2 mg by mouth.    gabapentin (NEURONTIN) 100 MG capsule Take 200 mg by mouth 2 (two) times daily.    ibuprofen (ADVIL,MOTRIN) 200 MG tablet Take 200 mg by mouth every 6 (six) hours as needed for Pain.    tizanidine 4 mg Cap Take by mouth.    topiramate (TOPAMAX) 100 MG tablet Take by mouth.    dextroamphetamine-amphetamine (ADDERALL XR) 30 MG 24 hr capsule Take 30 mg by mouth every morning.    lansoprazole (PREVACID) 15 MG capsule Take by mouth.    levoFLOXacin (LEVAQUIN) 750 MG tablet Take 1 tablet (750 mg total) by mouth once daily.    LURASIDONE HCL (LATUDA ORAL) Take by mouth.    omeprazole (PRILOSEC) 20 MG capsule Take 20 mg by mouth once daily.    ondansetron (ZOFRAN-ODT) 8 MG TbDL Take 1 tablet (8 mg total) by mouth every 6 (six) hours as needed.    oxycodone-acetaminophen (PERCOCET) 5-325 mg per tablet Take 1-2 tablets by mouth every 4 (four) hours as needed for Pain.    ranitidine (ZANTAC) 300 MG tablet Take 300 mg by mouth.      Family History     Problem Relation (Age of Onset)    Deep vein thrombosis Father, Brother        Social History Main Topics    Smoking status: Current Every Day Smoker     Types: Cigarettes    Smokeless tobacco: Never Used    Alcohol use Yes      Comment: social    Drug use: Yes     Types: Marijuana      Comment: daily    Sexual activity: Not on file     Review of Systems   Constitutional: Negative for fever.   HENT: Negative for trouble swallowing.    Eyes: Negative for photophobia.   Respiratory: Negative for shortness of breath.    Cardiovascular:  Negative for chest pain.   Gastrointestinal: Negative for abdominal pain.   Genitourinary: Negative for dyspareunia.   Neurological: Positive for seizures and headaches.   Psychiatric/Behavioral: Negative for hallucinations.     Objective:     Vital Signs (Most Recent):  Temp: 97.3 °F (36.3 °C) (03/28/18 1115)  Pulse: 64 (03/28/18 1115)  Resp: 18 (03/28/18 1115)  BP: 129/76 (03/28/18 1115)  SpO2: 99 % (03/28/18 1115) Vital Signs (24h Range):  Temp:  [97 °F (36.1 °C)-98.9 °F (37.2 °C)] 97.3 °F (36.3 °C)  Pulse:  [50-70] 64  Resp:  [14-24] 18  SpO2:  [94 %-100 %] 99 %  BP: ()/(55-85) 129/76     Weight: 80 kg (176 lb 5.9 oz)  Body mass index is 28.47 kg/m².    Physical Exam   Constitutional: She is oriented to person, place, and time. No distress.   HENT:   Head: Normocephalic and atraumatic.   Eyes: Right eye exhibits no discharge. Left eye exhibits no discharge.   Neck: Normal range of motion.   Cardiovascular: Normal rate.    Pulmonary/Chest: Effort normal.   Abdominal: Bowel sounds are normal.   Musculoskeletal: She exhibits no edema.   Neurological: She is oriented to person, place, and time. She has normal strength. She has a normal Finger-Nose-Finger Test, a normal Heel to Ha Test and a normal Romberg Test.   Reflex Scores:       Tricep reflexes are 2+ on the right side and 2+ on the left side.       Bicep reflexes are 2+ on the right side and 2+ on the left side.       Brachioradialis reflexes are 2+ on the right side and 2+ on the left side.       Patellar reflexes are 2+ on the right side and 2+ on the left side.       Achilles reflexes are 2+ on the right side and 2+ on the left side.  Skin: She is not diaphoretic.   Psychiatric: Her speech is normal.       NEUROLOGICAL EXAMINATION:     MENTAL STATUS   Oriented to person, place, and time.   Speech: speech is normal   Level of consciousness: alert    CRANIAL NERVES     CN III, IV, VI   Right pupil: Size: 3 mm. Shape: regular.   Left pupil: Size: 3 mm.  Shape: regular.   Nystagmus: none   Ophthalmoparesis: none    CN VII   Right facial weakness: none  Left facial weakness: none    CN IX, X   Palate: symmetric    CN XII   Tongue deviation: none    MOTOR EXAM   Muscle bulk: normal  Overall muscle tone: normal    Strength   Strength 5/5 throughout.     REFLEXES     Reflexes   Right brachioradialis: 2+  Left brachioradialis: 2+  Right biceps: 2+  Left biceps: 2+  Right triceps: 2+  Left triceps: 2+  Right patellar: 2+  Left patellar: 2+  Right achilles: 2+  Left achilles: 2+    SENSORY EXAM   Romberg: negative    GAIT AND COORDINATION      Coordination   Finger to nose coordination: normal  Heel to shin coordination: normal      Significant Labs:   CBC:   Recent Labs  Lab 03/27/18  1624 03/28/18  0459   WBC 7.22 6.00   HGB 12.6 12.4   HCT 38.1 38.1    240     CMP:   Recent Labs  Lab 03/27/18  1624 03/28/18  0459   GLU 81 97    140   K 4.4 3.9   * 113*   CO2 25 23   BUN 18 17   CREATININE 1.0 0.9   CALCIUM 8.9 8.4*   PROT 6.1 5.4*   ALBUMIN 3.6 3.2*   BILITOT 0.2 0.4   ALKPHOS 59 51*   AST 16 16   ALT 12 13   ANIONGAP 5* 4*   EGFRNONAA >60 >60       Significant Imaging:   Head CT    No acute intracranial abnormalities identified.    Right frontal coursing  shunt catheter with stable slit-like appearance of the ventricular system which could potentially reflect over shunting, however the appearance is unchanged over multiple prior CT exams.  No evidence of hydrocephalus.      Electronically signed by: Shanti Tong MD  Date: 03/27/2018  Time: 17:14    Assessment and Plan:     39 y/o female consulted for seizure/headaches    1. Headaches: pt has pseudotumor. She is on topiramate 100 mg twice daily. Acetazolamide is a better carbonic anhydrase inhibitor and better treatment for IIH. Ms Rogers has a  shunt as well.   -Increase topiramate to 200 mg twice daily   -Topiramate level.   -Would be of benefit for her to have input from neurosurgery  regarding her shunt. Consult while in hospital.    2. Seizures: not sure if these are seizures.   -Topiramate is an antiepileptic medication. As above increase to topiramate 200 mg twice daily.   -given the risk of these episode occurring:    Seizure restrictions are but not limited to: no driving for six months after last seizure; avoid swimming, high altitude activities, operating heavy machinery, bathing unattended, or engaging in activities in where a seizure will cause harm to self or others.   -Follow up with neurology.    I discussed plan with pt and told her that will check level of topiramate and that may need adjustment of dose on such medication. Also, told her that the neurosurgical input that she has been looking for we may be able to get it while she is here but Ms. Rogers got aggravated and states that she wanted to leave and didn't know the reason why she was here. Her partner asked her to stay.      Active Diagnoses:    Diagnosis Date Noted POA    PRINCIPAL PROBLEM:  Syncope and collapse [R55] 03/27/2018 Yes    Thyroid disease [E07.9] 03/27/2018 Yes     Chronic    Pseudotumor cerebri [G93.2] 03/27/2018 Yes     Chronic    Depression [F32.9] 03/27/2018 Yes     Chronic    Migraine headache [G43.909] 03/27/2018 Yes     Chronic      Problems Resolved During this Admission:    Diagnosis Date Noted Date Resolved POA       VTE Risk Mitigation     None          Thank you for your consult. I will follow-up with patient. Please contact us if you have any additional questions.    Srikanth Carpenter MD  Neurology  [facility]

## 2018-03-28 NOTE — HPI
38 y.o. female with seizure disorder, pseudotumor cerebri s/p shunt placement, depression, migraine headaches, and thyroid disease presents with a complaint of breakthrough seizure.  Her partner witness an episode of unresponsiveness during which her eyes rolled back and she had generalized convulsive movements.  Afterwards she was drowsy with no recollection of the incident, duration roughly 3 minutes.  No loss of bladder or bowel control.  She has been off of all her medications for months due to lack of insurance and cost, recently resumed medications last week.  She also drank alcohol heavily up until a few weeks ago and uses marijuana almost daily.  Denies fever, chills, vision changes, cough, SOB, chest pain, palpitations, peripheral edema, dizziness, abdominal pain, nausea, vomiting, diarrhea, dysuria, frequency, or urgency.  Work up largely unremarkable, save for an elevated D-dimer.  Large bore IV access unable to be obtained for CTA chest, V/Q scan ordered for AM.  Placed in observation for further monitoring while awaiting V/Q scan.

## 2018-03-28 NOTE — PROGRESS NOTES
DISCHARGE WRITTEN HEALTH  INFORMATION ON SYNCOPE    Follow-up care  Follow up with your healthcare provider, or as advised.    When to seek medical advice  Call your healthcare provider right away if any of these occur:  · Another fainting spell thats not explained by the common causes listed above  · Pain in your chest, arm, neck, jaw, back, or abdomen  · Shortness of breath  · Severe headache or seizure  · Blood in vomit or stools (black or red color)  · Unexpected vaginal bleeding  · Your heart beats very rapidly, very slowly, or irregularly (palpitations)  Also call your provider if you have signs of stroke:  · Weakness in an arm or leg or on one side of the face  · Difficulty speaking or seeing  · Extreme drowsiness, confusion, dizziness, or fainting

## 2018-03-28 NOTE — PROGRESS NOTES
"EDUCATION:  TN provided with educational information on Syncope.  Information reviewed and placed in :My Healthcare Packet" to be brought home for pt to use as resource after discharge.  Information included:  signs and symptoms to look for and call the doctor if experiencing, and symptoms that may indicate a medical emergency: CALL 911.    Reminded pt things she will be responsible for to manage her healthcare at home: getting Rx filled, attending follow up appointments, and taking medication as prescribed were discussed.   Teach back method used.  All questions answered.  Patient verbalized understanding of all information.      TN informed floor nurse, Bea, care management is complete and can proceed with discharge teaching.    "

## 2018-03-28 NOTE — DISCHARGE SUMMARY
[facility] Hospital Medicine  Discharge Summary      Patient Name: Nany Rogers  MRN: [id number]  Admission Date: 3/27/2018  Hospital Length of Stay: 0 days  Discharge Date and Time:  03/28/2018 3:26 PM  Attending Physician: Anny Tai MD   Discharging Provider: JULIO CÉSAR Tinoco  Primary Care Provider: Meredith Albrecht MD      HPI:   38 y.o. female with seizure disorder, pseudotumor cerebri s/p shunt placement, depression, migraine headaches, and thyroid disease presents with a complaint of breakthrough seizure.  Her partner witness an episode of unresponsiveness during which her eyes rolled back and she had generalized convulsive movements.  Afterwards she was drowsy with no recollection of the incident, duration roughly 3 minutes.  No loss of bladder or bowel control.  She has been off of all her medications for months due to lack of insurance and cost, recently resumed medications last week.  She also drank alcohol heavily up until a few weeks ago and uses marijuana almost daily.  Denies fever, chills, vision changes, cough, SOB, chest pain, palpitations, peripheral edema, dizziness, abdominal pain, nausea, vomiting, diarrhea, dysuria, frequency, or urgency.  Work up largely unremarkable, save for an elevated D-dimer.  Large bore IV access unable to be obtained for CTA chest, V/Q scan ordered for AM.  Placed in observation for further monitoring while awaiting V/Q scan.    * No surgery found *      Hospital Course:   CT completed stable right frontal coursing  shunt catheter noted and no acute intracranial abnormalities identified. Chest xray negative for acute cardiopulmonary findings. While d-dimer elevated at 0.72, VQ scan showed low probability (<20%) of acute pulmonary embolism. Suspected polypharmacy and possible benzo withdrawal contributory as patient reports recently running out of meds. She has been stretching out and weaning.  Neurology consulted and recommended  neurosurgery consult prior to discharge. Neurosurgery consulted and agreed with stable findings. Case management also consulted she tells CM she will be seen at [facility] for continued care and has enough seizure and anxiety medications to last until follow up.     Consults:   Consults         Status Ordering Provider     Inpatient consult to Neurology  Once     Provider:  Srikanth Carpenter MD    Completed JEFFERY CARLSON JR     Inpatient consult to Neurosurgery  Once     Provider:  Adolph Townsend DO    Acknowledged MARY CARRIZALES consult to case management  Once     Provider:  (Not yet assigned)    Acknowledged USMAN ARMENTA          No new Assessment & Plan notes have been filed under this hospital service since the last note was generated.  Service: Hospital Medicine    Final Active Diagnoses:    Diagnosis Date Noted POA    Thyroid disease [E07.9] 03/27/2018 Yes     Chronic    Pseudotumor cerebri [G93.2] 03/27/2018 Yes     Chronic    Depression [F32.9] 03/27/2018 Yes     Chronic    Migraine headache [G43.909] 03/27/2018 Yes     Chronic      Problems Resolved During this Admission:    Diagnosis Date Noted Date Resolved POA    PRINCIPAL PROBLEM:  Syncope and collapse [R55] 03/27/2018 03/28/2018 Yes       Discharged Condition: stable    Disposition: Home or Self Care    Follow Up:  Follow-up Information     [facility] Marky Weiss On 4/2/2018.    Why:  Outpatient Services, follow up with Becky Thompson at 4:00 PM  Contact information:  [address] [facility] [address]  [phone]                 Patient Instructions:     Diet Adult Regular     Activity as tolerated         Significant Diagnostic Studies: Labs:   BMP:   Recent Labs  Lab 03/27/18  1624 03/28/18  0459   GLU 81 97    140   K 4.4 3.9   * 113*   CO2 25 23   BUN 18 17   CREATININE 1.0 0.9   CALCIUM 8.9 8.4*   , CMP   Recent Labs  Lab 03/27/18  1624 03/28/18  0459    140   K 4.4 3.9   * 113*    CO2 25 23   GLU 81 97   BUN 18 17   CREATININE 1.0 0.9   CALCIUM 8.9 8.4*   PROT 6.1 5.4*   ALBUMIN 3.6 3.2*   BILITOT 0.2 0.4   ALKPHOS 59 51*   AST 16 16   ALT 12 13   ANIONGAP 5* 4*   ESTGFRAFRICA >60 >60   EGFRNONAA >60 >60   , CBC   Recent Labs  Lab 03/27/18  1624 03/28/18  0459   WBC 7.22 6.00   HGB 12.6 12.4   HCT 38.1 38.1    240   , INR   Lab Results   Component Value Date    INR 0.9 12/14/2016    and All labs within the past 24 hours have been reviewed         Medications:  Reconciled Home Medications:      Medication List      CONTINUE taking these medications    ALPRAZolam 2 MG Tab  Commonly known as:  XANAX     gabapentin 100 MG capsule  Commonly known as:  NEURONTIN     ibuprofen 200 MG tablet  Commonly known as:  ADVIL,MOTRIN     omeprazole 20 MG capsule  Commonly known as:  PRILOSEC     ondansetron 8 MG Tbdl  Commonly known as:  ZOFRAN-ODT  Take 1 tablet (8 mg total) by mouth every 6 (six) hours as needed.     topiramate 100 MG tablet  Commonly known as:  TOPAMAX        STOP taking these medications    dextroamphetamine-amphetamine 30 MG 24 hr capsule  Commonly known as:  ADDERALL XR     lansoprazole 15 MG capsule  Commonly known as:  PREVACID     LATUDA ORAL     levoFLOXacin 750 MG tablet  Commonly known as:  LEVAQUIN     oxyCODONE-acetaminophen 5-325 mg per tablet  Commonly known as:  PERCOCET     ranitidine 300 MG tablet  Commonly known as:  ZANTAC     tiZANidine 4 mg Cap            Indwelling Lines/Drains at time of discharge:   Lines/Drains/Airways          No matching active lines, drains, or airways          Time spent on the discharge of patient: 45 minutes  Patient was seen and examined on the date of discharge and determined to be suitable for discharge.         DL Mclain, FNP-C  Hospitalist - Department of Hospital Medicine  [address] [place] [address]  Office [phone]; Pager [phone]

## 2018-03-28 NOTE — PLAN OF CARE
03/28/18 1558   Final Note   Assessment Type Final Discharge Note   Discharge Disposition Home   What phone number can be called within the next 1-3 days to see how you are doing after discharge? (Listed in chart.)   Hospital Follow Up  Appt(s) scheduled? Yes   Discharge plans and expectations educations in teach back method with documentation complete? Yes   Right Care Referral Info   Post Acute Recommendation No Care

## 2018-03-28 NOTE — PROGRESS NOTES
ARTUR offered pt follow up with [facility] through the [facility] program; pt agreeable to participation and was entered into the system. Surinder from [facility] will call the pt on her room phone to do assessment. CLARENCE Suarez notified.

## 2018-03-28 NOTE — ED NOTES
Patient upset that she is being admitted, wants to go home.  Dr Markham at bedside to review plan of care with patient.

## 2018-03-28 NOTE — PROGRESS NOTES
TN emphasized importance of keeping appt at [facility] with PCP and Neurology Follow-Up. Pt was interested in transportation, help with medications, and a SW to assist with re-attaining SSI benefits.

## 2018-03-30 LAB — TOPIRAMATE SERPL-MCNC: 4.3 MCG/ML

## 2018-04-25 ENCOUNTER — OFFICE VISIT (OUTPATIENT)
Dept: NEUROSURGERY | Facility: CLINIC | Age: 38
End: 2018-04-25
Payer: MEDICAID

## 2018-04-25 VITALS
WEIGHT: 172.31 LBS | HEART RATE: 88 BPM | BODY MASS INDEX: 27.69 KG/M2 | DIASTOLIC BLOOD PRESSURE: 98 MMHG | HEIGHT: 66 IN | SYSTOLIC BLOOD PRESSURE: 136 MMHG | TEMPERATURE: 99 F

## 2018-04-25 DIAGNOSIS — R55 BLACKOUT SPELL: ICD-10-CM

## 2018-04-25 DIAGNOSIS — G93.2 PSEUDOTUMOR CEREBRI: Primary | Chronic | ICD-10-CM

## 2018-04-25 DIAGNOSIS — H54.3 VISION LOSS, BILATERAL: ICD-10-CM

## 2018-04-25 DIAGNOSIS — Z92.89 HISTORY OF CREATION OF VENTRICULOPERITONEAL SHUNT: ICD-10-CM

## 2018-04-25 PROCEDURE — 99203 OFFICE O/P NEW LOW 30 MIN: CPT | Mod: S$PBB,,, | Performed by: NEUROLOGICAL SURGERY

## 2018-04-25 PROCEDURE — 99214 OFFICE O/P EST MOD 30 MIN: CPT | Mod: PBBFAC,PO | Performed by: NEUROLOGICAL SURGERY

## 2018-04-25 PROCEDURE — 99999 PR PBB SHADOW E&M-EST. PATIENT-LVL IV: CPT | Mod: PBBFAC,,, | Performed by: NEUROLOGICAL SURGERY

## 2018-04-25 RX ORDER — TIZANIDINE 4 MG/1
TABLET ORAL
COMMUNITY
Start: 2018-04-23

## 2018-04-25 RX ORDER — TOPIRAMATE 25 MG/1
TABLET ORAL
COMMUNITY
Start: 2018-03-30

## 2018-04-25 RX ORDER — DOXYCYCLINE 100 MG/1
CAPSULE ORAL
COMMUNITY
Start: 2018-04-18

## 2018-04-25 RX ORDER — ZOLPIDEM TARTRATE 10 MG/1
TABLET ORAL
COMMUNITY
Start: 2018-04-20

## 2018-04-25 RX ORDER — DEXTROAMPHETAMINE SACCHARATE, AMPHETAMINE ASPARTATE, DEXTROAMPHETAMINE SULFATE AND AMPHETAMINE SULFATE 7.5; 7.5; 7.5; 7.5 MG/1; MG/1; MG/1; MG/1
90 TABLET ORAL
COMMUNITY

## 2018-04-25 RX ORDER — IBUPROFEN 600 MG/1
TABLET ORAL
COMMUNITY
Start: 2018-04-18

## 2018-04-25 NOTE — PROGRESS NOTES
CHIEF COMPLAINT:  Pseudotumor cerebri  Balckouts    HPI:  Nany Rogers is a 38 y.o.  female with long-standing history of pseudotumor cerebri that was diagnosed in 2006, who subsequently underwent  shunt placement in 2009 by Dr. Shin at [facility].  She had a revision later that same year and has not seen a neurosurgeon since.  She states that she has a programmable shunt and has a card with the settings but does not have it at this appointment.  She recently moved to the [facility] after residing in [place] approximately one year ago.  She was followed at [place] and has undergone many lumbar punctures, shunt taps, and neuro imaging.  She states that she was told that she should never have anything done to the shunt.  She was scheduled to see Dr. Aguirre at [facility] but never was able to see him.    She was recently hospitalized at [facility] after having a blackout, which she has been having for several months due to on known reasons.  She has undergone both a syncope and seizure workup without explanation.  She had an EKG, 24-hour Holter monitor, and EEG which were all negative.  A head CT done at that time demonstrated stable ventricular size and shunt placement.      She states that she has headaches constantly which are 4-5 out of 10 on average and typically has her blackouts when she's having a headache.  These blackouts occur 1-2 times per month, occur suddenly and without any identifiable triggers or prodrome.  She commonly has vision changes at the same time as her headaches.  She she is amnestic to these events but is told by witnesses that her eyes twitch, her head bothers, and her body shakes.  This is usually followed by loss of consciousness, speech difficulty, and she says that she has had tongue and cheek biting in the past but no urinary incontinence.    Patient's main concern are the blackouts as opposed to headaches or vision changes.  Her main goal is to get connected to other  providers that can help evaluate her.    Review of patient's allergies indicates:   Allergen Reactions    Clindamycin Hives    Biaxin [clarithromycin] Hives and Itching    Cephalexin Hives    Sulfa (sulfonamide antibiotics) Hives and Itching       Past Medical History:   Diagnosis Date    Asthma     Depression     GERD (gastroesophageal reflux disease)     Immune deficiency disorder     Migraine headache     Pseudotumor cerebri     Seizures     Thyroid disease      Past Surgical History:   Procedure Laterality Date    APPENDECTOMY      BRAIN SURGERY  2008    2x    FRACTURE SURGERY      KNEE SURGERY      LAPAROSCOPIC GASTRIC BANDING      SHUNT TAP      TONSILLECTOMY       Family History   Problem Relation Age of Onset    Deep vein thrombosis Father     Deep vein thrombosis Brother      Social History   Substance Use Topics    Smoking status: Current Every Day Smoker     Types: Cigarettes    Smokeless tobacco: Never Used    Alcohol use Yes      Comment: social        Review of Systems   Constitutional: Negative.    HENT: Negative for congestion, ear discharge, ear pain, hearing loss, nosebleeds, sinus pain and tinnitus.    Eyes: Negative.    Respiratory: Negative.    Cardiovascular: Negative for chest pain, palpitations, claudication and leg swelling.   Gastrointestinal: Negative for abdominal pain, blood in stool, constipation, diarrhea, melena and vomiting.   Genitourinary: Negative for flank pain, frequency and urgency.   Musculoskeletal: Negative for falls.   Skin: Negative.    Neurological: Positive for loss of consciousness and headaches.   Endo/Heme/Allergies: Does not bruise/bleed easily.   Psychiatric/Behavioral: Positive for depression and substance abuse (Marijuana daily). Negative for hallucinations, memory loss and suicidal ideas. The patient is nervous/anxious. The patient does not have insomnia.        OBJECTIVE:   Vital Signs:  Temp: 99.4 °F (37.4 °C) (04/25/18 1519)  Pulse: 88  (04/25/18 1519)  BP: (!) 136/98 (04/25/18 1519)    Physical Exam:  Constitutional: Patient sitting comfortably in chair. Appears well developed and well nourished.  Skin: Exposed areas are intact without abnormal markings, rashes or other lesions.  HEENT: Normocephalic. Normal conjunctivae.  Poor dentition  Cardiovascular: Normal rate and regular rhythm.  Respiratory: Chest wall rises and falls symmetrically, without signs of respiratory distress.  Abdomen: Soft and non-tender.  Extremities: Warm and without edema. Calves supple, non-tender.  Psych/Behavior: Anxious, fidgety, poor eye contact    Neurological:    Mental status: Alert and oriented. Conversational and appropriate.       Cranial Nerves: VFF to confrontation. PERRL. EOMI without nystagmus. Facial STLT normal and symmetric. Strong, symmetric muscles of mastication. Facial strength full and symmetric. Hearing equal bilaterally to finger rub. Palate and uvula rise and fall normally in midline. Shoulder shrug 5/5 strength. Tongue midline.     Motor:    Upper:  Deltoids Triceps Biceps WE WF     R 5/5 5/5 5/5 5/5 5/5 5/5    L 5/5 5/5 5/5 5/5 5/5 5/5      Lower:  HF KE KF DF PF EHL    R 5/5 5/5 5/5 5/5 5/5 5/5    L 5/5 5/5 5/5 5/5 5/5 5/5     Sensory: Intact sensation to light touch in all extremities. Romberg negative.    Reflexes:          DTR: 2+ symmetrically throughout.     Galvez's: Negative.     Babinski's: Negative.     Clonus: Negative.    Cerebellar: Finger-to-nose and rapid alternating movements normal. Gait stable, fluid.      Diagnostic Results:  All imaging was independently reviewed by me.    CT head, dated 3/28/18:  1. Slit-like ventricles stable to previous HCT on 9/2/16  2. R F shunt in good place within R F horn      ASSESSMENT/PLAN:     Nany Rogers has several year history of pseudotumor cerebri who underwent shunt placement in 2009 and outside facility, who presents with blackouts of unclear etiology.  She is currently connected with  [facility] medical services and is pending an echo.  She previous EKG, holter monitor, and EEG were all negative.  She denies any changes in her average daily headache and vision changes.  Given the stable head CTs, I do not believe that her blackouts are explained by pseudotumor cerebri or shunt malfunction.  Patient's main goal is to find out why she is having the blackouts and get connected to the appropriate providers.    1.  Referral to neurology for seizure workup  2.  Referral to ophthalmology for intermittent vision changes  3.  Ordered x-ray shunt series to evaluate shunt and shunt settings.  The patient understands and agrees with the plan of care. All questions were answered.  4.  Continue close follow-up with [facility] to continue syncopal workup  5.  Return to clinic on an as-needed basis            .

## 2021-07-01 ENCOUNTER — PATIENT MESSAGE (OUTPATIENT)
Dept: ADMINISTRATIVE | Facility: OTHER | Age: 41
End: 2021-07-01